# Patient Record
Sex: FEMALE | Race: WHITE | NOT HISPANIC OR LATINO | Employment: OTHER | URBAN - METROPOLITAN AREA
[De-identification: names, ages, dates, MRNs, and addresses within clinical notes are randomized per-mention and may not be internally consistent; named-entity substitution may affect disease eponyms.]

---

## 2013-05-22 LAB — CRC RECOMMENDATION EXT: NORMAL

## 2017-01-04 RX ORDER — ATORVASTATIN CALCIUM 10 MG/1
TABLET, FILM COATED ORAL
Qty: 90 TABLET | Refills: 0 | Status: SHIPPED | OUTPATIENT
Start: 2017-01-04 | End: 2017-03-12 | Stop reason: SDUPTHER

## 2017-01-09 ENCOUNTER — OFFICE VISIT (OUTPATIENT)
Dept: OBSTETRICS AND GYNECOLOGY | Facility: CLINIC | Age: 57
End: 2017-01-09
Payer: COMMERCIAL

## 2017-01-09 VITALS
SYSTOLIC BLOOD PRESSURE: 118 MMHG | BODY MASS INDEX: 21.8 KG/M2 | WEIGHT: 152.25 LBS | DIASTOLIC BLOOD PRESSURE: 70 MMHG | HEIGHT: 70 IN

## 2017-01-09 DIAGNOSIS — Z11.51 SCREENING FOR HUMAN PAPILLOMAVIRUS (HPV): ICD-10-CM

## 2017-01-09 DIAGNOSIS — Z12.31 ENCOUNTER FOR SCREENING MAMMOGRAM FOR BREAST CANCER: Primary | ICD-10-CM

## 2017-01-09 DIAGNOSIS — Z01.419 ENCOUNTER FOR GYNECOLOGICAL EXAMINATION: ICD-10-CM

## 2017-01-09 PROCEDURE — 87624 HPV HI-RISK TYP POOLED RSLT: CPT

## 2017-01-09 PROCEDURE — 99999 PR PBB SHADOW E&M-EST. PATIENT-LVL III: CPT | Mod: PBBFAC,,, | Performed by: OBSTETRICS & GYNECOLOGY

## 2017-01-09 PROCEDURE — 88175 CYTOPATH C/V AUTO FLUID REDO: CPT

## 2017-01-09 PROCEDURE — 99396 PREV VISIT EST AGE 40-64: CPT | Mod: S$GLB,,, | Performed by: OBSTETRICS & GYNECOLOGY

## 2017-01-09 RX ORDER — MEDROXYPROGESTERONE ACETATE 10 MG/1
5 TABLET ORAL DAILY
Qty: 10 TABLET | Refills: 0 | Status: SHIPPED | OUTPATIENT
Start: 2017-01-09 | End: 2018-07-02

## 2017-01-09 RX ORDER — ESTRADIOL 0.05 MG/D
PATCH, EXTENDED RELEASE TRANSDERMAL
COMMUNITY
Start: 2017-01-05 | End: 2017-01-09

## 2017-01-09 RX ORDER — BUPROPION HYDROCHLORIDE 300 MG/1
300 TABLET ORAL DAILY
Qty: 90 TABLET | Refills: 3 | Status: SHIPPED | OUTPATIENT
Start: 2017-01-09 | End: 2018-04-27 | Stop reason: SDUPTHER

## 2017-01-09 NOTE — PROGRESS NOTES
CC: Well woman exam    Jess Mena is a 56 y.o. female  presents for a well woman exam.  She is established. LMP: Patient's last menstrual period was 01/15/2014..    Patient without complaints.  Denies vaginal bleeding or abdominal pain.  Doing much better on patches.  Patient needs CombiPatch.  Was previously on Divigel and  Prometrium.  Patient does have some extra Divigel samples at home and is asking for progesterone for 1 month to take with the Divigel so it's not wasted.    Last colonoscopy  normal  Last Pap 2013 normal HPV negative  Last mammogram  normal    Health Maintenance   Topic Date Due    TETANUS VACCINE  1979    Influenza Vaccine  2016    Mammogram  2017    Pap Smear  2018    Lipid Panel  10/19/2020    Colonoscopy  08/15/2021    Hepatitis C Screening  Completed       Past Medical History   Diagnosis Date    Anxiety      wellbutrin 300    Constipation     Hyperlipidemia LDL goal < 160      muscle & joint aches with lipitor    Post-menopausal      Past Surgical History   Procedure Laterality Date    Endometrial ablation      Neck mass excision       Fatty Tumor in neck    Breast augmentation Bilateral      Social History     Social History    Marital status:      Spouse name: N/A    Number of children: 2    Years of education: N/A     Social History Main Topics    Smoking status: Never Smoker    Smokeless tobacco: Never Used    Alcohol use Yes      Comment: Occationally - once every few months    Drug use: No    Sexual activity: Yes     Partners: Male     Birth control/ protection: Post-menopausal, Surgical      Comment: :  Javi   (- )     Other Topics Concern    None     Social History Narrative    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     Family History   Problem Relation Age of Onset    Heart disease Father 60     cad    Hypertension Father     Stroke Father     Hyperlipidemia Father     Hyperlipidemia Mother     Heart disease Mother      asd    No Known Problems Sister     Hyperlipidemia Brother     No Known Problems Daughter     No Known Problems Daughter     Breast cancer Neg Hx     Cancer Neg Hx     Colon cancer Neg Hx     Ovarian cancer Neg Hx      OB History      Para Term  AB TAB SAB Ectopic Multiple Living    2 2 2       2        Obstetric Comments    Menarche age 12          Visit Vitals    /70    Ht 5' 10" (1.778 m)    Wt 69 kg (152 lb 3.6 oz)    LMP 01/15/2014    BMI 21.84 kg/m2         ROS:    ROS:  GENERAL: Denies weight gain or weight loss. Feeling well overall.   SKIN: Denies rash or lesions.   HEAD: Denies head injury or headache.   NODES: Denies enlarged lymph nodes.   CHEST: Denies chest pain or shortness of breath.   CARDIOVASCULAR: Denies palpitations or left sided chest pain.   ABDOMEN: No abdominal pain, constipation, diarrhea, nausea, vomiting or rectal bleeding.   URINARY: No frequency, dysuria, hematuria, or burning on urination.  REPRODUCTIVE: See HPI.   BREASTS: The patient performs breast self-examination and denies pain, lumps, or nipple discharge.   HEMATOLOGIC: No easy bruisability or excessive bleeding.   MUSCULOSKELETAL: Denies joint pain or swelling.   NEUROLOGIC: Denies syncope or weakness.   PSYCHIATRIC: Denies depression, anxiety or mood swings.    PHYSICAL EXAM:    APPEARANCE: Well nourished, well developed, in no acute distress.  AFFECT: WNL, alert and oriented x 3  SKIN: No acne or hirsutism  ABDOMEN: Soft.  No tenderness or masses.  No hepatosplenomegaly.  No hernias.  BREASTS: Symmetrical, no skin changes or visible lesions.  No palpable masses, nipple discharge bilaterally.  PELVIC: Normal external genitalia without lesions.  Normal hair " distribution.  Adequate perineal body, normal urethral meatus.  Vagina moist and well rugated without lesions or discharge.  Cervix pink, without lesions, discharge or tenderness.  No significant cystocele or rectocele.  Bimanual exam shows uterus to be normal size, regular, mobile and nontender.  Adnexa without masses or tenderness.    EXTREMITIES: No edema.      ICD-10-CM ICD-9-CM    1. Encounter for screening mammogram for breast cancer Z12.31 V76.12 Mammo Digital Screening Bilat with Tomosynthesis CAD      Mammo Digital Screening Bilat with Tomosynthesis CAD   2. Encounter for gynecological examination Z01.419 V72.31 Liquid-based pap smear, screening      estradiol-norethindrone (COMBIPATCH) 0.05-0.25 mg/24 hr   3. Screening for human papillomavirus (HPV) Z11.51 V73.81 HPV DNA probe, amplified         Patient was counseled today on A.C.S. Pap guidelines and recommendations for yearly pelvic exams, mammograms and monthly self breast exams; to see her PCP for other health maintenance.     Return in about 1 year (around 1/9/2018).

## 2017-01-09 NOTE — MR AVS SNAPSHOT
[address]  [place] [address]  Phone: [phone]  Fax: [phone]                  Jess Mena   2017 11:00 AM   Office Visit    Description:  Female : 1960   Provider:  Derick Villavicencio MD   Department:  [facility]           Reason for Visit     Well Woman           Diagnoses this Visit        Comments    Encounter for screening mammogram for breast cancer    -  Primary     Encounter for gynecological examination         Screening for human papillomavirus (HPV)                To Do List           Goals (5 Years of Data)     None      [facility]     [facility] Care [facility] -  Assistance  Registered nurses in the [facility] provide clinical advisement, health education, appointment booking, and other advisory services.  Call for this free service at [phone].             Medications           Message regarding Medications     Verify the changes and/or additions to your medication regime listed below are the same as discussed with your clinician today.  If any of these changes or additions are incorrect, please notify your healthcare provider.        STOP taking these medications     CREAM BASE NO. 9 (HRT BASE MISC) by Misc.(Non-Drug; Combo Route) route.    valacyclovir (VALTREX) 500 MG tablet Take 2 tablets (1,000 mg total) by mouth once daily.           Verify that the below list of medications is an accurate representation of the medications you are currently taking.  If none reported, the list may be blank. If incorrect, please contact your healthcare provider. Carry this list with you in case of emergency.           Current Medications     atorvastatin (LIPITOR) 10 MG tablet Take 1 tablet by mouth  daily    buPROPion (WELLBUTRIN XL) 300 MG 24 hr tablet Take 1 tablet (300 mg total) by mouth once daily.    progesterone (PROMETRIUM) 100 MG capsule Take 1 capsule (100 mg total) by mouth once daily.     "tretinoin, emollient, (RENOVA) 0.02 % Crea 1 Cream Topical Every day    VALACYCLOVIR HCL (VALTREX ORAL) as needed    VIVELLE-DOT 0.05 mg/24 hr            Clinical Reference Information           Vital Signs - Last Recorded  Most recent update: 1/9/2017 11:57 AM by Chelsey Bro MA    BP Ht Wt LMP BMI    118/70 5' 10" (1.778 m) 69 kg (152 lb 3.6 oz) 01/15/2014 21.84 kg/m2      Blood Pressure          Most Recent Value    BP  118/70      Allergies as of 1/9/2017     No Known Allergies      Immunizations Administered on Date of Encounter - 1/9/2017     None      Orders Placed During Today's Visit      Normal Orders This Visit    HPV DNA probe, amplified     Liquid-based pap smear, screening     Mammo Digital Screening Bilat with Tomosynthesis CAD     Future Labs/Procedures Expected by Expires    Mammo Digital Screening Bilat with Tomosynthesis CAD  1/9/2017 3/11/2018      "

## 2017-01-11 DIAGNOSIS — Z01.419 ENCOUNTER FOR GYNECOLOGICAL EXAMINATION: ICD-10-CM

## 2017-01-11 NOTE — TELEPHONE ENCOUNTER
Dr Villavicencio pt came in yesterday and Rx of Combioatch was suppose to go to her Mail order service not [facility]. Please send Rx to the [email or web address] mail order service.

## 2017-01-16 LAB — HUMAN PAPILLOMAVIRUS (HPV): NOT DETECTED

## 2017-01-18 ENCOUNTER — TELEPHONE (OUTPATIENT)
Dept: OBSTETRICS AND GYNECOLOGY | Facility: CLINIC | Age: 57
End: 2017-01-18

## 2017-01-18 NOTE — TELEPHONE ENCOUNTER
José Miguel, a pharmacist with [facility] called about pt's combipatch rx. He would like to know if this rx will take the place of the provera and vivelle dot rxs because the dosage of those two adds up to be the same dosage as combipatch.   He can be reached at [phone] option 1 and then the option to speak to a pharmacist and use reference # [id number]

## 2017-01-24 ENCOUNTER — TELEPHONE (OUTPATIENT)
Dept: OBSTETRICS AND GYNECOLOGY | Facility: CLINIC | Age: 57
End: 2017-01-24

## 2017-01-24 NOTE — TELEPHONE ENCOUNTER
Bone pt, was sent combipatch  by mail order pharmacy instead of vivelledot. Pt says someone from our office has to call mail order pharm [facility] and tell them that pt was not aware of medicine change so she can be reimbursed the $200 that she was charged for the combipatch.  [facility] phone # [phone]

## 2017-01-25 NOTE — TELEPHONE ENCOUNTER
LMOR for pt. Stating that combipatch is good to take but if we send in Brad dot she will have to take progesterone with it since she still has a uterus.

## 2017-02-20 ENCOUNTER — TELEPHONE (OUTPATIENT)
Dept: OBSTETRICS AND GYNECOLOGY | Facility: CLINIC | Age: 57
End: 2017-02-20

## 2017-02-20 NOTE — TELEPHONE ENCOUNTER
Dr Villavicencio pt calling, Just a FYI didn't get the Combipatch it was to expensive pt returned the meds to the pharmacy.

## 2017-02-23 NOTE — TELEPHONE ENCOUNTER
Spoke with patient she said she currently has a 3 month supply of Progesterone 100mg QD and Vivelle dot 0.05mg. She said she filled it right before her appointment. She will call back in 3 months for us to send new medication.

## 2017-03-12 DIAGNOSIS — Z00.00 ANNUAL PHYSICAL EXAM: Primary | ICD-10-CM

## 2017-03-13 RX ORDER — ATORVASTATIN CALCIUM 10 MG/1
TABLET, FILM COATED ORAL
Qty: 90 TABLET | Refills: 0 | Status: SHIPPED | OUTPATIENT
Start: 2017-03-13 | End: 2017-12-18 | Stop reason: SDUPTHER

## 2017-03-14 ENCOUNTER — TELEPHONE (OUTPATIENT)
Dept: INTERNAL MEDICINE | Facility: CLINIC | Age: 57
End: 2017-03-14

## 2017-03-14 DIAGNOSIS — Z00.00 ANNUAL PHYSICAL EXAM: Primary | ICD-10-CM

## 2017-03-14 NOTE — TELEPHONE ENCOUNTER
Pt has scheduled annual exam 5/2 with Dr. Arambula.  Pt requesting external lab orders to [facility].

## 2017-03-28 ENCOUNTER — PATIENT MESSAGE (OUTPATIENT)
Dept: OBSTETRICS AND GYNECOLOGY | Facility: CLINIC | Age: 57
End: 2017-03-28

## 2017-03-28 RX ORDER — VALACYCLOVIR HYDROCHLORIDE 500 MG/1
1000 TABLET, FILM COATED ORAL DAILY
Qty: 180 TABLET | Refills: 0 | Status: SHIPPED | OUTPATIENT
Start: 2017-03-28 | End: 2017-12-18 | Stop reason: SDUPTHER

## 2017-03-28 NOTE — TELEPHONE ENCOUNTER
Does she want a combination pill likefemhrt or does she want two separate pills such as estradiol and Prometrium.  She already has a prescription of Prometrium  Let me know and I will send them.

## 2017-03-28 NOTE — TELEPHONE ENCOUNTER
She wants a combination pill like Fem HRT  Orders she wants to continue her progesterone and added oral estradiol?

## 2017-03-28 NOTE — TELEPHONE ENCOUNTER
Bone pt wrote in through the portal saying that she is due for a refill of her vivelle dot and progesterone but that Dr. Villavicencio had mentioned a pill form for the estradiol instead of the patch. Also requesting a 90 day supply of valcyclovir. Allergies and pharmacy are up to date.

## 2017-03-29 NOTE — TELEPHONE ENCOUNTER
Spoke with patient. She picked up the rx for Combipatch but it was very expensive. She is okay with staying on the Vivelle dot patch and progesterone but will need rx sent to mail order pharmacy which I updated. The Vivelle-dot and progesterone are both on her Tier one.   From looking at old medication list she was on Vivelle-dot 0.05/mg/24 hr and progesterone 100 mg once daily.

## 2017-04-10 ENCOUNTER — TELEPHONE (OUTPATIENT)
Dept: INTERNAL MEDICINE | Facility: CLINIC | Age: 57
End: 2017-04-10

## 2017-04-10 DIAGNOSIS — N95.1 MENOPAUSAL SYNDROME: Primary | ICD-10-CM

## 2017-04-10 RX ORDER — ESTRADIOL 0.05 MG/D
1 FILM, EXTENDED RELEASE TRANSDERMAL WEEKLY
Qty: 12 PATCH | Refills: 3 | Status: SHIPPED | OUTPATIENT
Start: 2017-04-10 | End: 2017-04-11 | Stop reason: SDUPTHER

## 2017-04-10 RX ORDER — PROGESTERONE 100 MG/1
100 CAPSULE ORAL DAILY
Qty: 90 CAPSULE | Refills: 3 | Status: SHIPPED | OUTPATIENT
Start: 2017-04-10 | End: 2017-04-17 | Stop reason: SDUPTHER

## 2017-04-10 NOTE — TELEPHONE ENCOUNTER
Dr. Villavicencio's pt calling, pt would like a 90 day supply of vivelle-dot and progesterone 100 mg [facility] [phone]. Pt would also like a 1 month supply of progesterone to be sent to [facility] [phone] because her Rx will not be sent by the time she runs out of the Rx. Please call pt at [phone].

## 2017-04-10 NOTE — TELEPHONE ENCOUNTER
----- Message from Rajesh Conde sent at 4/10/2017  9:59 AM CDT -----  Type: Orders Request    What orders/ testing are being requested? Labs      Is there a future appointment scheduled for the patient with PCP? Yes     When?05/03/17      Comments: patient stated she need orders sent to lab court or her because of insurance . Please advise , Thanks  !

## 2017-04-11 RX ORDER — ESTRADIOL 0.05 MG/D
1 FILM, EXTENDED RELEASE TRANSDERMAL
Qty: 8 PATCH | Refills: 1 | Status: SHIPPED | OUTPATIENT
Start: 2017-04-13 | End: 2017-04-13 | Stop reason: SDUPTHER

## 2017-04-11 NOTE — TELEPHONE ENCOUNTER
[facility] requesting clarification for the Vivelle-Dot Rx. They state that the normal dosage is twice a week.

## 2017-04-11 NOTE — TELEPHONE ENCOUNTER
Pt calling requesting a one month supply of progesterone 100 mg be sent in to [facility] [phone]. She is out and mail order wont be to her in time.

## 2017-04-13 DIAGNOSIS — N95.1 MENOPAUSAL SYNDROME: ICD-10-CM

## 2017-04-13 RX ORDER — ESTRADIOL 0.05 MG/D
1 FILM, EXTENDED RELEASE TRANSDERMAL
Qty: 8 PATCH | Refills: 1 | Status: SHIPPED | OUTPATIENT
Start: 2017-04-13 | End: 2017-04-28 | Stop reason: SDUPTHER

## 2017-04-17 DIAGNOSIS — N95.1 MENOPAUSAL SYNDROME: ICD-10-CM

## 2017-04-17 RX ORDER — PROGESTERONE 100 MG/1
100 CAPSULE ORAL DAILY
Qty: 30 CAPSULE | Refills: 0 | Status: SHIPPED | OUTPATIENT
Start: 2017-04-17 | End: 2017-10-02 | Stop reason: SDUPTHER

## 2017-04-17 NOTE — TELEPHONE ENCOUNTER
Bone pt requesting refill of prometrium. Saw Dr. Saud spain her annual in January. Allergies and pharmacy are up to date. States that last prescription got sent to mail order and she was supossed to have a one month supply sent to her local pharmacy.

## 2017-04-25 LAB
ALBUMIN SERPL-MCNC: 4.6 G/DL (ref 3.5–5.5)
ALBUMIN/GLOB SERPL: 2.2 {RATIO} (ref 1.2–2.2)
ALP SERPL-CCNC: 51 IU/L (ref 39–117)
ALT SERPL-CCNC: 20 IU/L (ref 0–32)
AST SERPL-CCNC: 20 IU/L (ref 0–40)
BASOPHILS # BLD AUTO: 0 X10E3/UL (ref 0–0.2)
BASOPHILS NFR BLD AUTO: 0 %
BILIRUB SERPL-MCNC: 0.2 MG/DL (ref 0–1.2)
BUN SERPL-MCNC: 11 MG/DL (ref 6–24)
BUN/CREAT SERPL: 13 (ref 9–23)
CALCIUM SERPL-MCNC: 9.3 MG/DL (ref 8.7–10.2)
CHLORIDE SERPL-SCNC: 106 MMOL/L (ref 96–106)
CHOLEST SERPL-MCNC: 206 MG/DL (ref 100–199)
CO2 SERPL-SCNC: 21 MMOL/L (ref 18–29)
CREAT SERPL-MCNC: 0.84 MG/DL (ref 0.57–1)
EOSINOPHIL # BLD AUTO: 0.1 X10E3/UL (ref 0–0.4)
EOSINOPHIL NFR BLD AUTO: 2 %
ERYTHROCYTE [DISTWIDTH] IN BLOOD BY AUTOMATED COUNT: 13.7 % (ref 12.3–15.4)
GLOBULIN SER CALC-MCNC: 2.1 G/DL (ref 1.5–4.5)
GLUCOSE SERPL-MCNC: 81 MG/DL (ref 65–99)
HCT VFR BLD AUTO: 39.4 % (ref 34–46.6)
HDLC SERPL-MCNC: 87 MG/DL
HGB BLD-MCNC: 12.8 G/DL (ref 11.1–15.9)
IMM GRANULOCYTES # BLD: 0 X10E3/UL (ref 0–0.1)
IMM GRANULOCYTES NFR BLD: 0 %
LDLC SERPL CALC-MCNC: 105 MG/DL (ref 0–99)
LYMPHOCYTES # BLD AUTO: 1.3 X10E3/UL (ref 0.7–3.1)
LYMPHOCYTES NFR BLD AUTO: 28 %
MCH RBC QN AUTO: 31.9 PG (ref 26.6–33)
MCHC RBC AUTO-ENTMCNC: 32.5 G/DL (ref 31.5–35.7)
MCV RBC AUTO: 98 FL (ref 79–97)
MONOCYTES # BLD AUTO: 0.3 X10E3/UL (ref 0.1–0.9)
MONOCYTES NFR BLD AUTO: 6 %
NEUTROPHILS # BLD AUTO: 2.9 X10E3/UL (ref 1.4–7)
NEUTROPHILS NFR BLD AUTO: 64 %
PLATELET # BLD AUTO: 266 X10E3/UL (ref 150–379)
POTASSIUM SERPL-SCNC: 4.6 MMOL/L (ref 3.5–5.2)
PROT SERPL-MCNC: 6.7 G/DL (ref 6–8.5)
RBC # BLD AUTO: 4.01 X10E6/UL (ref 3.77–5.28)
SODIUM SERPL-SCNC: 147 MMOL/L (ref 134–144)
TRIGL SERPL-MCNC: 72 MG/DL (ref 0–149)
VLDLC SERPL CALC-MCNC: 14 MG/DL (ref 5–40)
WBC # BLD AUTO: 4.6 X10E3/UL (ref 3.4–10.8)

## 2017-04-28 DIAGNOSIS — N95.1 MENOPAUSAL SYNDROME: ICD-10-CM

## 2017-04-28 RX ORDER — ESTRADIOL 0.05 MG/D
1 FILM, EXTENDED RELEASE TRANSDERMAL
Qty: 8 PATCH | Refills: 6 | Status: SHIPPED | OUTPATIENT
Start: 2017-05-01 | End: 2017-05-19 | Stop reason: SDUPTHER

## 2017-04-28 NOTE — TELEPHONE ENCOUNTER
Bone pt requesting prescription of vivelle-dot be called in to mail order pharmacy. Saw Dr. Villavicencio for her annual in January. Allergies and pharmacy are up to date.

## 2017-05-02 ENCOUNTER — OFFICE VISIT (OUTPATIENT)
Dept: INTERNAL MEDICINE | Facility: CLINIC | Age: 57
End: 2017-05-02
Payer: COMMERCIAL

## 2017-05-02 VITALS
HEIGHT: 70 IN | OXYGEN SATURATION: 97 % | HEART RATE: 66 BPM | DIASTOLIC BLOOD PRESSURE: 70 MMHG | SYSTOLIC BLOOD PRESSURE: 94 MMHG | WEIGHT: 151 LBS | BODY MASS INDEX: 21.62 KG/M2

## 2017-05-02 DIAGNOSIS — Z78.9 FREQUENT USE OF LAXATIVES: ICD-10-CM

## 2017-05-02 DIAGNOSIS — Z00.00 ANNUAL PHYSICAL EXAM: Primary | ICD-10-CM

## 2017-05-02 DIAGNOSIS — E78.5 HYPERLIPIDEMIA, UNSPECIFIED HYPERLIPIDEMIA TYPE: ICD-10-CM

## 2017-05-02 DIAGNOSIS — F41.9 ANXIETY: ICD-10-CM

## 2017-05-02 DIAGNOSIS — K59.09 CONSTIPATION, CHRONIC: ICD-10-CM

## 2017-05-02 DIAGNOSIS — T78.3XXA PERIODIC EDEMA, INITIAL ENCOUNTER: ICD-10-CM

## 2017-05-02 PROCEDURE — 99999 PR PBB SHADOW E&M-EST. PATIENT-LVL III: CPT | Mod: PBBFAC,,, | Performed by: INTERNAL MEDICINE

## 2017-05-02 PROCEDURE — 99396 PREV VISIT EST AGE 40-64: CPT | Mod: S$GLB,,, | Performed by: INTERNAL MEDICINE

## 2017-05-02 RX ORDER — FUROSEMIDE 20 MG/1
TABLET ORAL
Qty: 30 TABLET | Refills: 0 | Status: SHIPPED | OUTPATIENT
Start: 2017-05-02 | End: 2020-07-29

## 2017-05-02 NOTE — PROGRESS NOTES
Subjective:       Patient ID: Jess Mena is a 56 y.o. female.    Chief Complaint: Annual Exam    HPI   C/o retaining fluid.  She takes her mother's lasix - half a tab every couple of weeks.    Taking wellbutrin for anxiety, which helps.     She has life long constipation.  She takes MOM  X 4 once a week.  She discussed with the physician who preformed colonoscopy.   Review of Systems   Constitutional: Negative for fever and unexpected weight change.   HENT: Positive for congestion (at times.  ). Negative for postnasal drip.    Respiratory: Negative for chest tightness, shortness of breath and wheezing.    Cardiovascular: Negative for chest pain and leg swelling.   Gastrointestinal: Negative for abdominal pain, anal bleeding, constipation, diarrhea, nausea and vomiting.   Genitourinary: Negative for dysuria and urgency.   Musculoskeletal: Negative for arthralgias and back pain.   Skin: Negative for rash.   Neurological: Negative for headaches.   Psychiatric/Behavioral: Negative for dysphoric mood and sleep disturbance (controlled with hormones). The patient is not nervous/anxious.        Objective:      Physical Exam   Constitutional: She is oriented to person, place, and time. She appears well-developed and well-nourished. No distress.   HENT:   Head: Normocephalic and atraumatic.   Right Ear: External ear normal.   Left Ear: External ear normal.   Nose: Nose normal.   Mouth/Throat: Oropharynx is clear and moist.   Eyes: Conjunctivae and EOM are normal. Pupils are equal, round, and reactive to light. Right eye exhibits no discharge. Left eye exhibits no discharge. No scleral icterus.   Neck: Normal range of motion. Neck supple. No JVD present. No thyromegaly present.   Cardiovascular: Normal rate, regular rhythm and normal heart sounds.  Exam reveals no gallop.    No murmur heard.  Pulmonary/Chest: Effort normal and breath sounds normal. No respiratory distress. She has no wheezes. She has no rales.   Abdominal:  Soft. Bowel sounds are normal. She exhibits no distension and no mass. There is no tenderness. There is no rebound and no guarding.   Musculoskeletal: Normal range of motion. She exhibits no edema or tenderness.   Lymphadenopathy:     She has no cervical adenopathy.   Neurological: She is alert and oriented to person, place, and time. No cranial nerve deficit. Coordination normal.   Skin: Skin is warm and dry. No rash noted.   Psychiatric: She has a normal mood and affect. Her behavior is normal. Judgment and thought content normal.       Results for orders placed or performed in visit on 03/14/17   CBC auto differential   Result Value Ref Range    WBC 4.6 3.4 - 10.8 x10E3/uL    RBC 4.01 3.77 - 5.28 x10E6/uL    Hemoglobin 12.8 11.1 - 15.9 g/dL    Hematocrit 39.4 34.0 - 46.6 %    MCV 98 (H) 79 - 97 fL    MCH 31.9 26.6 - 33.0 pg    MCHC 32.5 31.5 - 35.7 g/dL    RDW 13.7 12.3 - 15.4 %    Platelets 266 150 - 379 x10E3/uL    Neutrophils Relative 64 %    Lymph% 28 %    Mono% 6 %    Eosinophil% 2 %    Basophil% 0 %    Neutrophils Absolute 2.9 1.4 - 7.0 x10E3/uL    Lymph # 1.3 0.7 - 3.1 x10E3/uL    Mono # 0.3 0.1 - 0.9 x10E3/uL    Eos # 0.1 0.0 - 0.4 x10E3/uL    Baso # 0.0 0.0 - 0.2 x10E3/uL    Immature Granulocytes 0 %    Immature Grans (Abs) 0.0 0.0 - 0.1 x10E3/uL   Comprehensive metabolic panel   Result Value Ref Range    Glucose 81 65 - 99 mg/dL    BUN, Bld 11 6 - 24 mg/dL    Creatinine 0.84 0.57 - 1.00 mg/dL    eGFR if non African American 78 >59 mL/min/1.73    eGFR if African American 90 >59 mL/min/1.73    BUN/Creatinine Ratio 13 9 - 23    Sodium 147 (H) 134 - 144 mmol/L    Potassium 4.6 3.5 - 5.2 mmol/L    Chloride 106 96 - 106 mmol/L    CO2 21 18 - 29 mmol/L    Calcium 9.3 8.7 - 10.2 mg/dL    Total Protein 6.7 6.0 - 8.5 g/dL    Albumin 4.6 3.5 - 5.5 g/dL    Globulin, Total 2.1 1.5 - 4.5 g/dL    Albumin/Globulin Ratio 2.2 1.2 - 2.2    Total Bilirubin 0.2 0.0 - 1.2 mg/dL    Alkaline Phosphatase 51 39 - 117 IU/L    AST  20 0 - 40 IU/L    ALT 20 0 - 32 IU/L   Lipid panel   Result Value Ref Range    Cholesterol 206 (H) 100 - 199 mg/dL    Triglycerides 72 0 - 149 mg/dL    HDL 87 >39 mg/dL    VLDL Cholesterol Albert 14 5 - 40 mg/dL    LDL Calculated 105 (H) 0 - 99 mg/dL     Assessment:       1. Annual physical exam    2. Constipation, chronic    3. Hyperlipidemia, unspecified hyperlipidemia type    4. Anxiety    5. Periodic edema, initial encounter        Plan:       Jess was seen today for annual exam.    Diagnoses and all orders for this visit:    Annual physical exam    Constipation, chronic.   Laxative dependent      - fiber; try magnesium tablet.  Hyperlipidemia, unspecified hyperlipidemia type    Anxiety    Periodic edema, initial encounter    Other orders  -     furosemide (LASIX) 20 MG tablet; 1 tab po q day                       For prn, infrequent use only.                       Leg elevation, salt avoidance, compression hose.

## 2017-05-02 NOTE — MR AVS SNAPSHOT
[facility]  [address]  [place] [address]  Phone: [phone]  Fax: [phone]                  Jess Mena   2017 2:00 PM   Office Visit    Description:  Female : 1960   Provider:  Shanna Arambula MD   Department:  [facility] - [facility] Medicine           Reason for Visit     Annual Exam           Diagnoses this Visit        Comments    Annual physical exam    -  Primary     Constipation, chronic         Hyperlipidemia, unspecified hyperlipidemia type         Anxiety         Periodic edema, initial encounter                To Do List           Goals (5 Years of Data)     None      Follow-Up and Disposition     Return in about 1 year (around 2018) for PE.       These Medications        Disp Refills Start End    furosemide (LASIX) 20 MG tablet 30 tablet 0 2017     1 tab po q day    [address] #: [phone]         [facility]     [facility] On Call Nurse Care Line -  Assistance  Unless otherwise directed by your provider, please contact [facility], our nurse care line that is available for  assistance.     Registered nurses in the [facility] Center provide: appointment scheduling, clinical advisement, health education, and other advisory services.  Call: [phone] (toll free)               Medications           Message regarding Medications     Verify the changes and/or additions to your medication regime listed below are the same as discussed with your clinician today.  If any of these changes or additions are incorrect, please notify your healthcare provider.        START taking these NEW medications        Refills    furosemide (LASIX) 20 MG tablet 0    Si tab po q day    Class: Normal      STOP taking these medications     tretinoin, emollient, (RENOVA) 0.02 % Crea 1 Cream Topical Every day           Verify that the below list of  "medications is an accurate representation of the medications you are currently taking.  If none reported, the list may be blank. If incorrect, please contact your healthcare provider. Carry this list with you in case of emergency.           Current Medications     atorvastatin (LIPITOR) 10 MG tablet Take 1 tablet by mouth  daily    buPROPion (WELLBUTRIN XL) 300 MG 24 hr tablet Take 1 tablet (300 mg total) by mouth once daily.    estradiol 0.05 mg/24 hr td ptsw (VIVELLE-DOT) 0.05 mg/24 hr Place 1 patch onto the skin twice a week. Place 1 patch onto skin twice a week    medroxyPROGESTERone (PROVERA) 10 MG tablet Take 0.5 tablets (5 mg total) by mouth once daily.    progesterone (PROMETRIUM) 100 MG capsule Take 1 capsule (100 mg total) by mouth once daily.    furosemide (LASIX) 20 MG tablet 1 tab po q day    valacyclovir (VALTREX) 500 MG tablet Take 2 tablets (1,000 mg total) by mouth once daily.           Clinical Reference Information           Your Vitals Were     BP Pulse Height Weight Last Period SpO2    94/70 (BP Location: Left arm, Patient Position: Sitting, BP Method: Manual) 66 5' 10" (1.778 m) 68.5 kg (151 lb) 01/15/2014 97%    BMI                21.67 kg/m2          Blood Pressure          Most Recent Value    BP  94/70      Allergies as of 5/2/2017     No Known Allergies      Immunizations Administered on Date of Encounter - 5/2/2017     None      Language Assistance Services     ATTENTION: Language assistance services are available, free of charge. Please call [phone].      ATENCIÓN: Si habla mercyañol, tiene a carmichael disposición servicios gratuitos de asistencia lingüística. Llame al [phone].     CHÚ Ý: N?u b?n nói Ti?ng Vi?t, có các d?ch v? h? tr? ngôn ng? mi?n phí dành cho b?n. G?i s? [phone].         [facility] complies with applicable Federal civil rights laws and does not discriminate on the basis of race, color, national origin, age, disability, or sex.        "

## 2017-05-19 DIAGNOSIS — N95.1 MENOPAUSAL SYNDROME: ICD-10-CM

## 2017-05-19 RX ORDER — ESTRADIOL 0.05 MG/D
1 FILM, EXTENDED RELEASE TRANSDERMAL
Qty: 24 PATCH | Refills: 2 | Status: SHIPPED | OUTPATIENT
Start: 2017-05-22 | End: 2017-05-24 | Stop reason: SDUPTHER

## 2017-05-24 DIAGNOSIS — N95.1 MENOPAUSAL SYNDROME: ICD-10-CM

## 2017-05-24 RX ORDER — ESTRADIOL 0.05 MG/D
1 FILM, EXTENDED RELEASE TRANSDERMAL
Qty: 24 PATCH | Refills: 1 | Status: SHIPPED | OUTPATIENT
Start: 2017-05-25 | End: 2017-10-02 | Stop reason: SDUPTHER

## 2017-10-02 ENCOUNTER — PATIENT MESSAGE (OUTPATIENT)
Dept: OBSTETRICS AND GYNECOLOGY | Facility: CLINIC | Age: 57
End: 2017-10-02

## 2017-10-02 DIAGNOSIS — N95.1 MENOPAUSAL SYNDROME: ICD-10-CM

## 2017-10-02 RX ORDER — PROGESTERONE 100 MG/1
100 CAPSULE ORAL DAILY
Qty: 90 CAPSULE | Refills: 0 | Status: SHIPPED | OUTPATIENT
Start: 2017-10-02 | End: 2017-12-20 | Stop reason: SDUPTHER

## 2017-10-02 RX ORDER — ESTRADIOL 0.05 MG/D
1 FILM, EXTENDED RELEASE TRANSDERMAL
Qty: 24 PATCH | Refills: 0 | Status: SHIPPED | OUTPATIENT
Start: 2017-10-02 | End: 2017-12-20 | Stop reason: SDUPTHER

## 2017-12-18 RX ORDER — ATORVASTATIN CALCIUM 10 MG/1
10 TABLET, FILM COATED ORAL DAILY
Qty: 90 TABLET | Refills: 1 | OUTPATIENT
Start: 2017-12-18 | End: 2017-12-20 | Stop reason: SDUPTHER

## 2017-12-18 RX ORDER — VALACYCLOVIR HYDROCHLORIDE 500 MG/1
1000 TABLET, FILM COATED ORAL DAILY
Qty: 180 TABLET | Refills: 0 | Status: SHIPPED | OUTPATIENT
Start: 2017-12-18 | End: 2018-07-02 | Stop reason: SDUPTHER

## 2017-12-18 NOTE — TELEPHONE ENCOUNTER
Bone pt- requesting refill of Valtrex. Patient seen for annual 5/2/17.    Allergies and Pharm UTD  Valtrex pended

## 2017-12-20 DIAGNOSIS — N95.1 MENOPAUSAL SYNDROME: ICD-10-CM

## 2017-12-20 RX ORDER — ATORVASTATIN CALCIUM 10 MG/1
10 TABLET, FILM COATED ORAL DAILY
Qty: 90 TABLET | Refills: 0 | Status: SHIPPED | OUTPATIENT
Start: 2017-12-20 | End: 2018-03-01 | Stop reason: SDUPTHER

## 2017-12-20 RX ORDER — ESTRADIOL 0.05 MG/D
1 FILM, EXTENDED RELEASE TRANSDERMAL
Qty: 24 PATCH | Refills: 0 | Status: SHIPPED | OUTPATIENT
Start: 2017-12-21 | End: 2018-02-24 | Stop reason: SDUPTHER

## 2017-12-20 RX ORDER — PROGESTERONE 100 MG/1
100 CAPSULE ORAL DAILY
Qty: 90 CAPSULE | Refills: 0 | Status: SHIPPED | OUTPATIENT
Start: 2017-12-20 | End: 2018-03-01 | Stop reason: SDUPTHER

## 2017-12-20 NOTE — TELEPHONE ENCOUNTER
Patient requesting refill of Prometrium and Vivelle-dot. Seen for annual on 1/9/17.    Allergies and Pharm UTD  prometrium and Vivelle pended

## 2018-02-24 DIAGNOSIS — N95.1 MENOPAUSAL SYNDROME: ICD-10-CM

## 2018-02-26 RX ORDER — ESTRADIOL 0.05 MG/D
FILM, EXTENDED RELEASE TRANSDERMAL
Qty: 8 PATCH | Refills: 11 | Status: SHIPPED | OUTPATIENT
Start: 2018-02-26 | End: 2018-02-28 | Stop reason: SDUPTHER

## 2018-02-28 RX ORDER — ESTRADIOL 0.05 MG/D
FILM, EXTENDED RELEASE TRANSDERMAL
Qty: 24 PATCH | Refills: 3 | Status: SHIPPED | OUTPATIENT
Start: 2018-02-28 | End: 2018-07-02 | Stop reason: SDUPTHER

## 2018-02-28 NOTE — TELEPHONE ENCOUNTER
Spoke to [facility]ist at [facility], quantity prescribed is less than allowed on patient's Rx plan. They are asking for a 90 day supply so that the patient's insurance will cover.    Allergies and Pharm UTD  Estradiol patch pended with 90 day supply and 3 additional refills.

## 2018-02-28 NOTE — TELEPHONE ENCOUNTER
Pharm fax RX CLARIFICATION REQUEST    ESTRADIOL TRANSDERM PATCH 8S    The quantity originally prescribed is less than allowed on the patient's prescription plan. Increasing the days supply may allow the patient to take full advantage of their plan benefits. Please sign and return form to approve a new 90-DAY prescription.    Pharm P:[phone]

## 2018-03-01 DIAGNOSIS — N95.1 MENOPAUSAL SYNDROME: ICD-10-CM

## 2018-03-02 RX ORDER — ATORVASTATIN CALCIUM 10 MG/1
TABLET, FILM COATED ORAL
Qty: 90 TABLET | Refills: 0 | Status: SHIPPED | OUTPATIENT
Start: 2018-03-02 | End: 2018-06-03 | Stop reason: SDUPTHER

## 2018-03-02 RX ORDER — PROGESTERONE 100 MG/1
CAPSULE ORAL
Qty: 90 CAPSULE | Refills: 0 | Status: SHIPPED | OUTPATIENT
Start: 2018-03-02 | End: 2018-05-31 | Stop reason: SDUPTHER

## 2018-04-27 RX ORDER — BUPROPION HYDROCHLORIDE 300 MG/1
300 TABLET ORAL DAILY
Qty: 90 TABLET | Refills: 0 | Status: SHIPPED | OUTPATIENT
Start: 2018-04-27 | End: 2018-07-02 | Stop reason: SDUPTHER

## 2018-05-31 DIAGNOSIS — N95.1 MENOPAUSAL SYNDROME: ICD-10-CM

## 2018-05-31 RX ORDER — PROGESTERONE 100 MG/1
CAPSULE ORAL
Qty: 90 CAPSULE | Refills: 0 | Status: SHIPPED | OUTPATIENT
Start: 2018-05-31 | End: 2018-07-02 | Stop reason: SDUPTHER

## 2018-06-04 ENCOUNTER — PATIENT MESSAGE (OUTPATIENT)
Dept: FAMILY MEDICINE | Facility: CLINIC | Age: 58
End: 2018-06-04

## 2018-06-04 DIAGNOSIS — Z00.00 ANNUAL PHYSICAL EXAM: Primary | ICD-10-CM

## 2018-06-04 RX ORDER — ATORVASTATIN CALCIUM 10 MG/1
TABLET, FILM COATED ORAL
Qty: 90 TABLET | Refills: 0 | Status: SHIPPED | OUTPATIENT
Start: 2018-06-04 | End: 2018-08-28 | Stop reason: SDUPTHER

## 2018-06-07 ENCOUNTER — CLINICAL SUPPORT (OUTPATIENT)
Dept: OTHER | Facility: CLINIC | Age: 58
End: 2018-06-07
Payer: COMMERCIAL

## 2018-06-07 VITALS
SYSTOLIC BLOOD PRESSURE: 107 MMHG | BODY MASS INDEX: 21.9 KG/M2 | HEIGHT: 70 IN | DIASTOLIC BLOOD PRESSURE: 60 MMHG | WEIGHT: 153 LBS

## 2018-06-07 DIAGNOSIS — Z00.8 HEALTH EXAMINATION IN POPULATION SURVEYS: Primary | ICD-10-CM

## 2018-06-07 LAB
GLUCOSE SERPL-MCNC: 97 MG/DL (ref 60–140)
POC CHOLESTEROL, HDL: 99 MG/DL (ref 40–?)
POC CHOLESTEROL, LDL: NORMAL MG/DL (ref ?–160)
POC CHOLESTEROL, TOTAL: 207 MG/DL (ref ?–240)
POC GLUCOSE FASTING: NORMAL MG/DL (ref 60–110)
POC TOTAL CHOLESTEROL / HDL RATIO: 2.05 (ref ?–6)
POC TRIGLYCERIDES: 53 MG/DL (ref ?–160)

## 2018-06-07 PROCEDURE — 82947 ASSAY GLUCOSE BLOOD QUANT: CPT | Mod: QW,S$GLB,, | Performed by: INTERNAL MEDICINE

## 2018-06-07 PROCEDURE — 80061 LIPID PANEL: CPT | Mod: QW,S$GLB,, | Performed by: INTERNAL MEDICINE

## 2018-06-07 PROCEDURE — 99401 PREV MED CNSL INDIV APPRX 15: CPT | Mod: S$GLB,,, | Performed by: INTERNAL MEDICINE

## 2018-06-23 LAB
ALBUMIN SERPL-MCNC: 4.8 G/DL (ref 3.5–5.5)
ALBUMIN/GLOB SERPL: 2.1 {RATIO} (ref 1.2–2.2)
ALP SERPL-CCNC: 58 IU/L (ref 39–117)
ALT SERPL-CCNC: 21 IU/L (ref 0–32)
AST SERPL-CCNC: 22 IU/L (ref 0–40)
BASOPHILS # BLD AUTO: 0 X10E3/UL (ref 0–0.2)
BASOPHILS NFR BLD AUTO: 0 %
BILIRUB SERPL-MCNC: 0.4 MG/DL (ref 0–1.2)
BUN SERPL-MCNC: 21 MG/DL (ref 6–24)
BUN/CREAT SERPL: 25 (ref 9–23)
CALCIUM SERPL-MCNC: 9.3 MG/DL (ref 8.7–10.2)
CHLORIDE SERPL-SCNC: 103 MMOL/L (ref 96–106)
CHOLEST SERPL-MCNC: 205 MG/DL (ref 100–199)
CO2 SERPL-SCNC: 22 MMOL/L (ref 20–29)
CREAT SERPL-MCNC: 0.84 MG/DL (ref 0.57–1)
EOSINOPHIL # BLD AUTO: 0.1 X10E3/UL (ref 0–0.4)
EOSINOPHIL NFR BLD AUTO: 2 %
ERYTHROCYTE [DISTWIDTH] IN BLOOD BY AUTOMATED COUNT: 13.7 % (ref 12.3–15.4)
GFR SERPLBLD CREATININE-BSD FMLA CKD-EPI: 78 ML/MIN/1.73
GFR SERPLBLD CREATININE-BSD FMLA CKD-EPI: 90 ML/MIN/1.73
GLOBULIN SER CALC-MCNC: 2.3 G/DL (ref 1.5–4.5)
GLUCOSE SERPL-MCNC: 86 MG/DL (ref 65–99)
HCT VFR BLD AUTO: 40.9 % (ref 34–46.6)
HDLC SERPL-MCNC: 82 MG/DL
HGB BLD-MCNC: 13.1 G/DL (ref 11.1–15.9)
IMM GRANULOCYTES # BLD: 0 X10E3/UL (ref 0–0.1)
IMM GRANULOCYTES NFR BLD: 0 %
LDLC SERPL CALC-MCNC: 109 MG/DL (ref 0–99)
LYMPHOCYTES # BLD AUTO: 1.5 X10E3/UL (ref 0.7–3.1)
LYMPHOCYTES NFR BLD AUTO: 20 %
MCH RBC QN AUTO: 32.6 PG (ref 26.6–33)
MCHC RBC AUTO-ENTMCNC: 32 G/DL (ref 31.5–35.7)
MCV RBC AUTO: 102 FL (ref 79–97)
MONOCYTES # BLD AUTO: 0.7 X10E3/UL (ref 0.1–0.9)
MONOCYTES NFR BLD AUTO: 9 %
NEUTROPHILS # BLD AUTO: 5.3 X10E3/UL (ref 1.4–7)
NEUTROPHILS NFR BLD AUTO: 69 %
PLATELET # BLD AUTO: 278 X10E3/UL (ref 150–379)
POTASSIUM SERPL-SCNC: 4.4 MMOL/L (ref 3.5–5.2)
PROT SERPL-MCNC: 7.1 G/DL (ref 6–8.5)
RBC # BLD AUTO: 4.02 X10E6/UL (ref 3.77–5.28)
SODIUM SERPL-SCNC: 140 MMOL/L (ref 134–144)
TRIGL SERPL-MCNC: 68 MG/DL (ref 0–149)
VLDLC SERPL CALC-MCNC: 14 MG/DL (ref 5–40)
WBC # BLD AUTO: 7.7 X10E3/UL (ref 3.4–10.8)

## 2018-07-02 ENCOUNTER — OFFICE VISIT (OUTPATIENT)
Dept: OBSTETRICS AND GYNECOLOGY | Facility: CLINIC | Age: 58
End: 2018-07-02
Payer: COMMERCIAL

## 2018-07-02 VITALS
HEIGHT: 70 IN | BODY MASS INDEX: 22.09 KG/M2 | WEIGHT: 154.31 LBS | DIASTOLIC BLOOD PRESSURE: 70 MMHG | SYSTOLIC BLOOD PRESSURE: 122 MMHG

## 2018-07-02 DIAGNOSIS — F41.9 ANXIETY: Primary | ICD-10-CM

## 2018-07-02 DIAGNOSIS — N95.1 MENOPAUSAL SYNDROME: ICD-10-CM

## 2018-07-02 DIAGNOSIS — Z01.419 ENCOUNTER FOR GYNECOLOGICAL EXAMINATION: ICD-10-CM

## 2018-07-02 PROCEDURE — 99999 PR PBB SHADOW E&M-EST. PATIENT-LVL III: CPT | Mod: PBBFAC,,, | Performed by: OBSTETRICS & GYNECOLOGY

## 2018-07-02 PROCEDURE — 99396 PREV VISIT EST AGE 40-64: CPT | Mod: S$GLB,,, | Performed by: OBSTETRICS & GYNECOLOGY

## 2018-07-02 RX ORDER — BUPROPION HYDROCHLORIDE 300 MG/1
300 TABLET ORAL DAILY
Qty: 90 TABLET | Refills: 3 | Status: SHIPPED | OUTPATIENT
Start: 2018-07-02 | End: 2019-06-27 | Stop reason: SDUPTHER

## 2018-07-02 RX ORDER — ESTRADIOL 0.05 MG/D
FILM, EXTENDED RELEASE TRANSDERMAL
Qty: 24 PATCH | Refills: 3 | Status: SHIPPED | OUTPATIENT
Start: 2018-07-02 | End: 2019-06-26 | Stop reason: SDUPTHER

## 2018-07-02 RX ORDER — VALACYCLOVIR HYDROCHLORIDE 500 MG/1
1000 TABLET, FILM COATED ORAL DAILY
Qty: 180 TABLET | Refills: 0 | Status: SHIPPED | OUTPATIENT
Start: 2018-07-02 | End: 2018-12-19 | Stop reason: SDUPTHER

## 2018-07-02 RX ORDER — PROGESTERONE 100 MG/1
100 CAPSULE ORAL DAILY
Qty: 90 CAPSULE | Refills: 3 | Status: SHIPPED | OUTPATIENT
Start: 2018-07-02 | End: 2019-08-04 | Stop reason: SDUPTHER

## 2018-07-02 NOTE — PROGRESS NOTES
Chief Complaint: well woman exam  Well Woman (Annual Exam, last pap  pap & hpv negative, Last mammo  at [facility] per pt.)      Jess Mena is a 57 y.o. female  presents for a well woman exam.    She is established.  Doing well On HRT and Wellbutrin  Menopausal No PMB No abd pain     LMP: Patient's last menstrual period was 01/15/2014..      Last pap: 2017 normal and hpv neg  Last MM2018 [facility] normal per pt  Last colonoscopy:  normal      Current Outpatient Prescriptions:     atorvastatin (LIPITOR) 10 MG tablet, TAKE 1 TABLET DAILY, Disp: 90 tablet, Rfl: 0    buPROPion (WELLBUTRIN XL) 300 MG 24 hr tablet, Take 1 tablet (300 mg total) by mouth once daily., Disp: 90 tablet, Rfl: 3    estradiol 0.05 mg/24 hr td ptsw (VIVELLE-DOT) 0.05 mg/24 hr, APPLY 1 PATCH ONTO THE SKIN TWICE WEEKLY, Disp: 24 patch, Rfl: 3    furosemide (LASIX) 20 MG tablet, 1 tab po q day, Disp: 30 tablet, Rfl: 0    progesterone (PROMETRIUM) 100 MG capsule, Take 1 capsule (100 mg total) by mouth once daily., Disp: 90 capsule, Rfl: 3    valACYclovir (VALTREX) 500 MG tablet, Take 2 tablets (1,000 mg total) by mouth once daily., Disp: 180 tablet, Rfl: 0    Past Medical History:   Diagnosis Date    Anxiety     wellbutrin 300    Constipation     Hyperlipidemia LDL goal < 160     muscle & joint aches with lipitor    Post-menopausal        Past Surgical History:   Procedure Laterality Date    BREAST AUGMENTATION Bilateral     ENDOMETRIAL ABLATION      NECK MASS EXCISION      Fatty Tumor in neck       OB History    Para Term  AB Living   2 2 2     2   SAB TAB Ectopic Multiple Live Births           2      # Outcome Date GA Lbr Lawrence/2nd Weight Sex Delivery Anes PTL Lv   2 Term  40w0d  3.374 kg (7 lb 7 oz) F Vag-Spont EPI  ALFONSO   1 Term  40w0d  3.629 kg (8 lb) F Vag-Spont EPI  ALFONSO      Obstetric Comments   Menarche age 12       Family History   Problem Relation Age of Onset    Heart  "disease Father 60        cad    Hypertension Father     Stroke Father     Hyperlipidemia Father     Hyperlipidemia Mother     Heart disease Mother         asd    Hypertension Mother     No Known Problems Sister     Hyperlipidemia Brother     Hypertension Brother     No Known Problems Daughter     No Known Problems Daughter     Breast cancer Neg Hx     Cancer Neg Hx     Colon cancer Neg Hx     Ovarian cancer Neg Hx        Social History   Substance Use Topics    Smoking status: Never Smoker    Smokeless tobacco: Never Used    Alcohol use Yes      Comment: Occationally - once every few months         ROS:    GENERAL: Denies weight gain or weight loss. Feeling well overall.   SKIN: Denies rash or lesions.   HEENT: Denies headaches, or vision changes.   CARDIOVASCULAR: Denies palpitations or left sided chest pain.   RESPIRATORY: Denies shortness of breath or dyspnea on exertion.  BREASTS: Denies pain, lumps, or nipple discharge.   ABDOMEN: Denies abdominal pain, constipation, diarrhea, nausea, vomiting, change in appetite or rectal bleeding.   URINARY: Denies frequency, dysuria, hematuria.  NEUROLOGIC: Denies syncope or weakness.   PSYCHIATRIC: Denies depression, anxiety or mood swings.    Physical Exam:  /70   Ht 5' 10" (1.778 m)   Wt 70 kg (154 lb 5.2 oz)   LMP 01/15/2014   BMI 22.14 kg/m²     APPEARANCE: Well nourished, well developed, in no acute distress.  AFFECT: WNL, alert and oriented x 3  SKIN: No acne or hirsutism  BREASTS: Symmetrical, no skin changes.                    No nipple discharge. No palpable masses bilaterally  ABDOMEN: soft Non tender Non distended No masses  PELVIC:   Normal external genitalia without lesions.  Normal hair distribution.   Adequate perineal body, normal urethral meatus. No signif cystocele or rectocele.  Vagina moist and well rugated without lesions or discharge.    Cervix pink, without lesions, discharge or tenderness.    Bimanual exam shows uterus to " be normal size, regular, mobile and nontender.  Adnexa without masses or tenderness.    EXTREMITIES: No edema.    ASSESSMENT AND PLAN  1. Anxiety  buPROPion (WELLBUTRIN XL) 300 MG 24 hr tablet   2. Menopausal syndrome  estradiol 0.05 mg/24 hr td ptsw (VIVELLE-DOT) 0.05 mg/24 hr    progesterone (PROMETRIUM) 100 MG capsule   3. Encounter for gynecological examination         Jess was seen today for well woman.    Diagnoses and all orders for this visit:    Anxiety  -     buPROPion (WELLBUTRIN XL) 300 MG 24 hr tablet; Take 1 tablet (300 mg total) by mouth once daily.    Menopausal syndrome  -     estradiol 0.05 mg/24 hr td ptsw (VIVELLE-DOT) 0.05 mg/24 hr; APPLY 1 PATCH ONTO THE SKIN TWICE WEEKLY  -     progesterone (PROMETRIUM) 100 MG capsule; Take 1 capsule (100 mg total) by mouth once daily.    Encounter for gynecological examination   Pap not due til 2020    Other orders  -     valACYclovir (VALTREX) 500 MG tablet; Take 2 tablets (1,000 mg total) by mouth once daily.        Patient was counseled today on A.C.S. Pap guidelines and recommendations for yearly pelvic exams, mammograms and monthly self breast exams; to see her PCP for other health maintenance.     Patient encouraged to register for portal and results will be sent via portal.    Follow-up in about 1 year (around 7/2/2019).         Health Maintenance   Topic Date Due    TETANUS VACCINE  08/03/1979    Mammogram  09/29/2017    Influenza Vaccine  08/01/2018    Pap Smear with HPV Cotest  01/09/2020    Colonoscopy  08/15/2021    Lipid Panel  06/22/2023    Hepatitis C Screening  Completed

## 2018-08-29 RX ORDER — ATORVASTATIN CALCIUM 10 MG/1
TABLET, FILM COATED ORAL
Qty: 90 TABLET | Refills: 0 | Status: SHIPPED | OUTPATIENT
Start: 2018-08-29 | End: 2018-11-26 | Stop reason: SDUPTHER

## 2018-10-18 ENCOUNTER — OFFICE VISIT (OUTPATIENT)
Dept: FAMILY MEDICINE | Facility: CLINIC | Age: 58
End: 2018-10-18
Payer: COMMERCIAL

## 2018-10-18 VITALS
SYSTOLIC BLOOD PRESSURE: 98 MMHG | BODY MASS INDEX: 22.38 KG/M2 | WEIGHT: 156.31 LBS | HEIGHT: 70 IN | TEMPERATURE: 98 F | HEART RATE: 63 BPM | DIASTOLIC BLOOD PRESSURE: 62 MMHG

## 2018-10-18 DIAGNOSIS — Z00.00 ANNUAL PHYSICAL EXAM: Primary | ICD-10-CM

## 2018-10-18 DIAGNOSIS — A60.00 GENITAL HERPES SIMPLEX, UNSPECIFIED SITE: ICD-10-CM

## 2018-10-18 DIAGNOSIS — F41.9 ANXIETY: ICD-10-CM

## 2018-10-18 DIAGNOSIS — Z23 NEED FOR PROPHYLACTIC VACCINATION AND INOCULATION AGAINST INFLUENZA: ICD-10-CM

## 2018-10-18 DIAGNOSIS — T78.3XXA PERIODIC EDEMA, INITIAL ENCOUNTER: ICD-10-CM

## 2018-10-18 DIAGNOSIS — Z28.39 IMMUNIZATION DEFICIENCY: ICD-10-CM

## 2018-10-18 PROCEDURE — 99999 PR PBB SHADOW E&M-EST. PATIENT-LVL III: CPT | Mod: PBBFAC,,, | Performed by: FAMILY MEDICINE

## 2018-10-18 PROCEDURE — 90471 IMMUNIZATION ADMIN: CPT | Mod: S$GLB,,, | Performed by: FAMILY MEDICINE

## 2018-10-18 PROCEDURE — 90686 IIV4 VACC NO PRSV 0.5 ML IM: CPT | Mod: S$GLB,,, | Performed by: FAMILY MEDICINE

## 2018-10-18 PROCEDURE — 90715 TDAP VACCINE 7 YRS/> IM: CPT | Mod: S$GLB,,, | Performed by: FAMILY MEDICINE

## 2018-10-18 PROCEDURE — 99396 PREV VISIT EST AGE 40-64: CPT | Mod: 25,S$GLB,, | Performed by: FAMILY MEDICINE

## 2018-10-18 PROCEDURE — 90472 IMMUNIZATION ADMIN EACH ADD: CPT | Mod: S$GLB,,, | Performed by: FAMILY MEDICINE

## 2018-10-18 RX ORDER — HYDROCHLOROTHIAZIDE 25 MG/1
25 TABLET ORAL DAILY
Qty: 30 TABLET | Refills: 0 | Status: SHIPPED | OUTPATIENT
Start: 2018-10-18 | End: 2021-06-14 | Stop reason: SDUPTHER

## 2018-10-18 NOTE — PROGRESS NOTES
Subjective:      Patient ID: Jess Mena is a 58 y.o. female.    Chief Complaint: Annual Exam and Flu Vaccine    Here today for general exam.     She follows with gynecologist for female exam is and mammogram at [facility]    She has always had difficulty with constipation, about once a week.  She states she drinks lots of water    Previous physician prescribed Lasix 20 mg p.r.n. intermittent leg edema.  She prescribed this medication because she was using her mom's.  This #30  has lasted 1 year.    Denies any chest pain, shortness of breath, nausea vomiting diarrhea, blood in stool, heartburn    The 10-year ASCVD risk score (Keveldon BOOTH Jr., et al., 2013) is: 1.1%    Values used to calculate the score:      Age: 58 years      Sex: Female      Is Non- : No      Diabetic: No      Tobacco smoker: No      Systolic Blood Pressure: 98 mmHg      Is BP treated: No      HDL Cholesterol: 82 mg/dL      Total Cholesterol: 205 mg/dL      No results found for: HGBA1C  No results found for: MICALBCREAT  Lab Results   Component Value Date    LDLCALC 109 (H) 06/22/2018    LDLCALC 105 (H) 04/24/2017    CHOL 205 (H) 06/22/2018    HDL 82 06/22/2018    TRIG 68 06/22/2018       Lab Results   Component Value Date     06/22/2018    K 4.4 06/22/2018     06/22/2018    CO2 22 06/22/2018    GLU 86 06/22/2018    BUN 21 06/22/2018    CREATININE 0.84 06/22/2018    CALCIUM 9.3 06/22/2018    PROT 7.1 06/22/2018    ALBUMIN 4.8 06/22/2018    BILITOT 0.4 06/22/2018    ALKPHOS 58 06/22/2018    AST 22 06/22/2018    ALT 21 06/22/2018    EGFRNONAA 78 06/22/2018    WBC 7.7 06/22/2018    HGB 13.1 06/22/2018    HCT 40.9 06/22/2018     (H) 06/22/2018     06/22/2018    TSH 0.669 05/28/2009         Current Outpatient Medications on File Prior to Visit   Medication Sig    atorvastatin (LIPITOR) 10 MG tablet TAKE 1 TABLET DAILY    buPROPion (WELLBUTRIN XL) 300 MG 24 hr tablet Take 1 tablet (300 mg total) by mouth once  "daily.    estradiol 0.05 mg/24 hr td ptsw (VIVELLE-DOT) 0.05 mg/24 hr APPLY 1 PATCH ONTO THE SKIN TWICE WEEKLY    furosemide (LASIX) 20 MG tablet 1 tab po q day    progesterone (PROMETRIUM) 100 MG capsule Take 1 capsule (100 mg total) by mouth once daily.    valACYclovir (VALTREX) 500 MG tablet Take 2 tablets (1,000 mg total) by mouth once daily.     No current facility-administered medications on file prior to visit.      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        Walks several times a week, weight training.     Family History   Problem Relation Age of Onset    Heart disease Father 60        cad    Hypertension Father     Stroke Father     Hyperlipidemia Father     Hyperlipidemia Mother     Heart disease Mother         asd    Hypertension Mother     No Known Problems Sister     Hyperlipidemia Brother     Hypertension Brother     No Known Problems Daughter     No Known Problems Daughter     Breast cancer Neg Hx     Cancer Neg Hx     Colon cancer Neg Hx     Ovarian cancer Neg Hx      Vitals:    10/18/18 1018   BP: 98/62   Pulse: 63   Temp: 98.3 °F (36.8 °C)   TempSrc: Oral   Weight: 70.9 kg (156 lb 4.9 oz)   Height: 5' 9.5" (1.765 m)   PainSc: 0-No pain     Objective:   Physical Exam   Constitutional: She is oriented to person, place, and time. She appears " well-developed and well-nourished.   HENT:   Head: Normocephalic and atraumatic.   Right Ear: External ear normal.   Left Ear: External ear normal.   Nose: Nose normal.   Mouth/Throat: Oropharynx is clear and moist.   Eyes: EOM are normal. Pupils are equal, round, and reactive to light.   Neck: Neck supple. No thyromegaly present.   Cardiovascular: Normal rate, regular rhythm, normal heart sounds and intact distal pulses.   No murmur heard.  Pulmonary/Chest: Effort normal and breath sounds normal. She has no wheezes.   Abdominal: Soft. Bowel sounds are normal. She exhibits no distension and no mass. There is no tenderness. There is no rebound and no guarding.   Musculoskeletal: She exhibits no edema.   Lymphadenopathy:     She has no cervical adenopathy.   Neurological: She is alert and oriented to person, place, and time.   Skin: Skin is warm and dry.   Psychiatric: She has a normal mood and affect. Her behavior is normal.     Assessment:     1. Annual physical exam    2. Anxiety    3. Genital herpes simplex, unspecified site    4. Immunization deficiency    5. Need for prophylactic vaccination and inoculation against influenza    6. Periodic edema, initial encounter      Plan:     Orders Placed This Encounter    (In Office Administered) Tdap Vaccine    Flu Vaccine - Quadrivalent (PF) (3 years & older)    hydroCHLOROthiazide (HYDRODIURIL) 25 MG tablet     Stop Lasix    She desires to continue Wellbutrin 300, but okay to wean to 150 and I can send refills whenever she likes    Patient Instructions   Due for  Vaccines  - tetanus     Follow with GYN for female health & cancer prevention    ==========  Increase WATER INTAKE - your body systems work best & smoothly with 8 glasses of water a day.     Increase FIBER INTAKE - your body is happiest with FIVE  fruits and  vegetables daily. Challenge yourself to eat FIVE fruit/ vegetable COLORS in a day. You will be amazed as your body regulates itself.     You could try  taking ACIDOPHILUS or a PROBIOTIC daily (over the counter) - to give back the good normal bugs (normal gut Marisol) to your GI tract -- that we tend to kill off with our American diets    With respect to FIBER intake, also try eating Cheerios or Oatmeal daily. This absorbs water & help your colon to work smoothly.    If you have lots of GAS - Switch to NON-FERMENTABLE FIBER - calcium polycarbophil , 1-2tablets,  2 times a day    Drink enough water that your urine is clear (which hydrates your colon &  kidneys)    A FULL GLASS OF WATER UPON AWAKENING  will wake up your colon & start your GI tract moving.     Drink a glass of water before any caffeinated drink (coffee, iced tea, carbonated beverage, energy drink)    Decrease caffeine  & chocolate (caffeine is dehydrating)    GET MOVING-- Walking & being active (20 minutes most days of the week) tells your colon to start moving also. On the other hand, sitting in a chair most of the day tells your GI tract to slow to a halt. Give it a boost with exercise.    If you do get constipated & do not have a bowel movement after 3 days -- try 8 glasses of water a day, Colace stool softener 2 tablets twice daily, AND a laxative twice daily. If no bowel movement after 24 hours, start an enema twice daily. Come in to see me if no bowel movement.       ==============================  RECOMMENDATIONS FOR FEMALES  ==============================  Your #1 MEDICINE is DAILY EXERCISE - 15-20 minutes of huffing & puffing EVERY DAY.     Prevent the #1 cause of death- cardiovascular disease (HEART ATTACK & STROKE) by checking for normal blood pressure, cholesterol, sugars, & by not smoking.     VACCINES: Yearly FLU shot, PNEUMONIA shot after 65,  SHINGLES shot after 50    Screening colonoscopy at AGE  50 & every 10 years to check for COLON CANCER,  one of the most common & preventable cancers (Or FIT kit yearly) Repeat in 3 years if POLYP found     I recommend  high fiber (5 fresh fruits or  vegetables daily), low fat diet and aerobic  exercise (huffing/ puffing/ sweating for 20 min straight at least 4 days a week)    Follow up yearly with mammogram, fasting lipids, CMP, CBC prior.   ==============================================================                                  Answers for HPI/ROS submitted by the patient on 10/18/2018   activity change: No  unexpected weight change: No  neck pain: No  hearing loss: No  rhinorrhea: No  trouble swallowing: No  eye discharge: No  visual disturbance: No  chest tightness: No  wheezing: No  chest pain: No  palpitations: No  blood in stool: No  constipation: No  vomiting: No  diarrhea: No  polydipsia: No  polyuria: No  difficulty urinating: No  hematuria: No  menstrual problem: No  dysuria: No  joint swelling: No  arthralgias: No  headaches: No  weakness: No  confusion: No  dysphoric mood: No

## 2018-10-18 NOTE — PROGRESS NOTES
Two patient identifiers used, allergies reviewed, vaccines confirmed.  Tdap vaccine administered IM left deltoid.  Influenza vaccine administered right deltoid.  Pt tolerated both injections well, no redness or bruising at either injection site.  Pt advised to remain in clinic 15 mins following injections for observation.

## 2018-10-18 NOTE — PATIENT INSTRUCTIONS
Due for  Vaccines  - tetanus     Follow with GYN for female health & cancer prevention    ==========  Increase WATER INTAKE - your body systems work best & smoothly with 8 glasses of water a day.     Increase FIBER INTAKE - your body is happiest with FIVE  fruits and  vegetables daily. Challenge yourself to eat FIVE fruit/ vegetable COLORS in a day. You will be amazed as your body regulates itself.     You could try taking ACIDOPHILUS or a PROBIOTIC daily (over the counter) - to give back the good normal bugs (normal gut Marisol) to your GI tract -- that we tend to kill off with our American diets    With respect to FIBER intake, also try eating Cheerios or Oatmeal daily. This absorbs water & help your colon to work smoothly.    If you have lots of GAS - Switch to NON-FERMENTABLE FIBER - calcium polycarbophil , 1-2tablets,  2 times a day    Drink enough water that your urine is clear (which hydrates your colon &  kidneys)    A FULL GLASS OF WATER UPON AWAKENING  will wake up your colon & start your GI tract moving.     Drink a glass of water before any caffeinated drink (coffee, iced tea, carbonated beverage, energy drink)    Decrease caffeine  & chocolate (caffeine is dehydrating)    GET MOVING-- Walking & being active (20 minutes most days of the week) tells your colon to start moving also. On the other hand, sitting in a chair most of the day tells your GI tract to slow to a halt. Give it a boost with exercise.    If you do get constipated & do not have a bowel movement after 3 days -- try 8 glasses of water a day, Colace stool softener 2 tablets twice daily, AND a laxative twice daily. If no bowel movement after 24 hours, start an enema twice daily. Come in to see me if no bowel movement.       ==============================  RECOMMENDATIONS FOR FEMALES  ==============================  Your #1 MEDICINE is DAILY EXERCISE - 15-20 minutes of huffing & puffing EVERY DAY.     Prevent the #1 cause of death-  cardiovascular disease (HEART ATTACK & STROKE) by checking for normal blood pressure, cholesterol, sugars, & by not smoking.     VACCINES: Yearly FLU shot, PNEUMONIA shot after 65,  SHINGLES shot after 50    Screening colonoscopy at AGE  50 & every 10 years to check for COLON CANCER,  one of the most common & preventable cancers (Or FIT kit yearly) Repeat in 3 years if POLYP found     I recommend  high fiber (5 fresh fruits or vegetables daily), low fat diet and aerobic  exercise (huffing/ puffing/ sweating for 20 min straight at least 4 days a week)    Follow up yearly with mammogram, fasting lipids, CMP, CBC prior.   ==============================================================

## 2018-11-27 RX ORDER — ATORVASTATIN CALCIUM 10 MG/1
TABLET, FILM COATED ORAL
Qty: 90 TABLET | Refills: 3 | Status: SHIPPED | OUTPATIENT
Start: 2018-11-27 | End: 2019-11-27 | Stop reason: SDUPTHER

## 2018-12-19 RX ORDER — VALACYCLOVIR HYDROCHLORIDE 500 MG/1
500 TABLET, FILM COATED ORAL DAILY
Qty: 90 TABLET | Refills: 0 | Status: SHIPPED | OUTPATIENT
Start: 2018-12-19 | End: 2019-09-04 | Stop reason: SDUPTHER

## 2019-01-23 ENCOUNTER — OFFICE VISIT (OUTPATIENT)
Dept: PODIATRY | Facility: CLINIC | Age: 59
End: 2019-01-23
Payer: COMMERCIAL

## 2019-01-23 VITALS
SYSTOLIC BLOOD PRESSURE: 105 MMHG | BODY MASS INDEX: 22.33 KG/M2 | HEIGHT: 70 IN | DIASTOLIC BLOOD PRESSURE: 59 MMHG | HEART RATE: 66 BPM | WEIGHT: 156 LBS

## 2019-01-23 DIAGNOSIS — M67.479 GANGLION CYST OF FOOT: Primary | ICD-10-CM

## 2019-01-23 PROCEDURE — 99203 PR OFFICE/OUTPT VISIT, NEW, LEVL III, 30-44 MIN: ICD-10-PCS | Mod: 25,S$GLB,, | Performed by: PODIATRIST

## 2019-01-23 PROCEDURE — 20600: ICD-10-PCS | Mod: RT,S$GLB,, | Performed by: PODIATRIST

## 2019-01-23 PROCEDURE — 99999 PR PBB SHADOW E&M-EST. PATIENT-LVL IV: CPT | Mod: PBBFAC,,, | Performed by: PODIATRIST

## 2019-01-23 PROCEDURE — 99999 PR PBB SHADOW E&M-EST. PATIENT-LVL IV: ICD-10-PCS | Mod: PBBFAC,,, | Performed by: PODIATRIST

## 2019-01-23 PROCEDURE — 99203 OFFICE O/P NEW LOW 30 MIN: CPT | Mod: 25,S$GLB,, | Performed by: PODIATRIST

## 2019-01-23 PROCEDURE — 3008F BODY MASS INDEX DOCD: CPT | Mod: CPTII,S$GLB,, | Performed by: PODIATRIST

## 2019-01-23 PROCEDURE — 3008F PR BODY MASS INDEX (BMI) DOCUMENTED: ICD-10-PCS | Mod: CPTII,S$GLB,, | Performed by: PODIATRIST

## 2019-01-23 PROCEDURE — 20600 DRAIN/INJ JOINT/BURSA W/O US: CPT | Mod: RT,S$GLB,, | Performed by: PODIATRIST

## 2019-01-23 RX ORDER — DEXAMETHASONE SODIUM PHOSPHATE 4 MG/ML
4 INJECTION, SOLUTION INTRA-ARTICULAR; INTRALESIONAL; INTRAMUSCULAR; INTRAVENOUS; SOFT TISSUE EVERY 6 HOURS
Status: DISCONTINUED | OUTPATIENT
Start: 2019-01-23 | End: 2020-07-31

## 2019-01-23 RX ORDER — DEXAMETHASONE SODIUM PHOSPHATE 4 MG/ML
4 INJECTION, SOLUTION INTRA-ARTICULAR; INTRALESIONAL; INTRAMUSCULAR; INTRAVENOUS; SOFT TISSUE
Status: DISCONTINUED | OUTPATIENT
Start: 2019-01-23 | End: 2019-01-23 | Stop reason: HOSPADM

## 2019-01-23 RX ADMIN — DEXAMETHASONE SODIUM PHOSPHATE 4 MG: 4 INJECTION, SOLUTION INTRA-ARTICULAR; INTRALESIONAL; INTRAMUSCULAR; INTRAVENOUS; SOFT TISSUE at 01:01

## 2019-01-23 NOTE — PROCEDURES
Small Joint Aspiration/Injection: R third PIP  Date/Time: 1/23/2019 1:39 PM  Performed by: Pat Thao DPM  Authorized by: Pat Thao DPM     Consent Done?:  Yes (Verbal)  Site marked: The procedure site was marked    Timeout: Prior to procedure the correct patient, procedure, and site was verified      Location:  Third toe  Site:  R third PIP  Prep: Patient was prepped and draped in usual sterile fashion    Ultrasonic Guidance for needle placement: No  Needle size:  25 G  Approach:  Dorsal  Medications:  4 mg dexamethasone 4 mg/mL  Aspirate amount (ml):  0.5  Aspirate:  Clear and serous  Patient tolerance:  Patient tolerated the procedure well with no immediate complications     Additional Comments: Timeout was performed with pt in room

## 2019-01-23 NOTE — PROGRESS NOTES
Subjective:      Patient ID: Jess Mena is a 58 y.o. female.    Chief Complaint: Ganglion Cyst (Right 3rd metatarsal)    58 years old female presenting with cyst on top of right 3rd toe.  She 1st noticed few months ago. She has been soaking and also of flank topical antibiotics.  Denies any pain, but discomfort when she wears closed shoes.     Review of Systems   Constitution: Negative for decreased appetite, fever, weakness and malaise/fatigue.   HENT: Negative for congestion.    Cardiovascular: Negative for chest pain and leg swelling.   Respiratory: Negative for cough and shortness of breath.    Skin: Positive for suspicious lesions. Negative for color change, nail changes and rash.   Musculoskeletal: Negative for arthritis, joint pain, joint swelling and muscle weakness.   Gastrointestinal: Negative for bloating, abdominal pain, nausea and vomiting.   Neurological: Negative for headaches and numbness.   Psychiatric/Behavioral: Negative for altered mental status.           Objective:      Physical Exam   Constitutional: She is oriented to person, place, and time. She appears well-developed and well-nourished. No distress.   Musculoskeletal: She exhibits tenderness. She exhibits no edema or deformity.   Neurological: She is oriented to person, place, and time.   Skin: Skin is warm. Capillary refill takes less than 2 seconds. No erythema.   Psychiatric: She has a normal mood and affect. Her behavior is normal.     Vascular: Distal DP/PT pulses palpable 2/4 b/l. CRT < 3 sec to tips of toes. No edema noted to b/l LE. No vericosities noted to b/l LEs. Hair growth present b/l LE, warm to touch b/l LE    Dermatologic: No open lesions, lacerations or wounds.  Interdigital spaces clean, dry and intact b/l. No erythema, rubor, calor noted to b/l LE  Right 3rd toe: + soft tissue mass/fluid-filled cyst, nonmobile    Musculoskeletal: MMT 5/5 in DF/PF/Inv/Ev resistance with no reproduction of pain in any direction. Passive  range of motion of ankle and pedal joints is painless b/l. No calf tenderness b/l LE, Compartments soft/compressible b/l.   Right 3rd toe:  Mild tenderness upon palpation.     Neurological: Light touch, proprioception, and sharp/dull sensation are all intact b/l. Protective threshold with the [place]-Wienstein monofilament is intact b/l. Vibratory sensation intact b/l.     1/23/19                Assessment:       Encounter Diagnosis   Name Primary?    Ganglion cyst of foot - Right Foot Yes         Plan:       Jess was seen today for ganglion cyst.    Diagnoses and all orders for this visit:    Ganglion cyst of foot - Right Foot      I counseled the patient on her conditions, their implications and medical management.    58 years old female with right foot ganglion cyst versus epidermal cyst on 3rd toe    -status post aspiration and injection of steroid.  See procedure note.  Patient tolerated well  -topical triple abx applied with bandaid  -I told her about possibility of recurrence, if so I recommended soft tissue mass resection surgically vs fusion of the joint.   -The nature of the condition, options for management, as well as potential risks and complications were discussed in detail with patient. Patient was amenable to my recommendations and left my office fully informed and will follow up as instructed or sooner if necessary.    -Patient was advised of signs and symptoms of infection including redness, drainage, purulence, odor, streaking, fever, chills and I advised patient to seek medical attention (ER or urgent care) if these symptoms arise.

## 2019-05-21 ENCOUNTER — CLINICAL SUPPORT (OUTPATIENT)
Dept: OTHER | Facility: CLINIC | Age: 59
End: 2019-05-21
Payer: COMMERCIAL

## 2019-05-21 DIAGNOSIS — Z00.8 ENCOUNTER FOR OTHER GENERAL EXAMINATION: ICD-10-CM

## 2019-05-21 PROCEDURE — 99401 PREV MED CNSL INDIV APPRX 15: CPT | Mod: S$GLB,,, | Performed by: INTERNAL MEDICINE

## 2019-05-21 PROCEDURE — 80061 LIPID PANEL: CPT | Mod: QW,S$GLB,, | Performed by: INTERNAL MEDICINE

## 2019-05-21 PROCEDURE — 82947 ASSAY GLUCOSE BLOOD QUANT: CPT | Mod: QW,S$GLB,, | Performed by: INTERNAL MEDICINE

## 2019-05-21 PROCEDURE — 99401 PR PREVENT COUNSEL,INDIV,15 MIN: ICD-10-PCS | Mod: S$GLB,,, | Performed by: INTERNAL MEDICINE

## 2019-05-21 PROCEDURE — 82947 PR  ASSAY QUANTITATIVE,BLOOD GLUCOSE: ICD-10-PCS | Mod: QW,S$GLB,, | Performed by: INTERNAL MEDICINE

## 2019-05-21 PROCEDURE — 80061 PR  LIPID PANEL: ICD-10-PCS | Mod: QW,S$GLB,, | Performed by: INTERNAL MEDICINE

## 2019-05-22 VITALS — BODY MASS INDEX: 22.71 KG/M2 | HEIGHT: 70 IN

## 2019-05-22 LAB
GLUCOSE SERPL-MCNC: 92 MG/DL (ref 60–140)
HDLC SERPL-MCNC: 95 MG/DL
POC CHOLESTEROL, LDL (DOCK): 118 MG/DL
POC CHOLESTEROL, TOTAL: 230 MG/DL
TRIGL SERPL-MCNC: 88 MG/DL

## 2019-06-25 DIAGNOSIS — N95.1 MENOPAUSAL SYNDROME: ICD-10-CM

## 2019-06-26 RX ORDER — ESTRADIOL 0.05 MG/D
FILM, EXTENDED RELEASE TRANSDERMAL
Qty: 24 PATCH | Refills: 3 | Status: SHIPPED | OUTPATIENT
Start: 2019-06-26 | End: 2020-01-30 | Stop reason: SDUPTHER

## 2019-06-27 DIAGNOSIS — F41.9 ANXIETY: ICD-10-CM

## 2019-06-28 RX ORDER — BUPROPION HYDROCHLORIDE 300 MG/1
TABLET ORAL
Qty: 90 TABLET | Refills: 0 | Status: SHIPPED | OUTPATIENT
Start: 2019-06-28 | End: 2019-09-25 | Stop reason: SDUPTHER

## 2019-08-04 DIAGNOSIS — N95.1 MENOPAUSAL SYNDROME: ICD-10-CM

## 2019-08-04 RX ORDER — PROGESTERONE 100 MG/1
CAPSULE ORAL
Qty: 90 CAPSULE | Refills: 3 | Status: SHIPPED | OUTPATIENT
Start: 2019-08-04 | End: 2020-01-30 | Stop reason: SDUPTHER

## 2019-09-04 RX ORDER — VALACYCLOVIR HYDROCHLORIDE 500 MG/1
500 TABLET, FILM COATED ORAL DAILY
Qty: 90 TABLET | Refills: 0 | Status: SHIPPED | OUTPATIENT
Start: 2019-09-04 | End: 2020-10-11 | Stop reason: SDUPTHER

## 2019-09-25 DIAGNOSIS — F41.9 ANXIETY: ICD-10-CM

## 2019-09-26 RX ORDER — BUPROPION HYDROCHLORIDE 300 MG/1
TABLET ORAL
Qty: 90 TABLET | Refills: 4 | Status: SHIPPED | OUTPATIENT
Start: 2019-09-26 | End: 2020-01-30 | Stop reason: SDUPTHER

## 2019-10-10 ENCOUNTER — OFFICE VISIT (OUTPATIENT)
Dept: DERMATOLOGY | Facility: CLINIC | Age: 59
End: 2019-10-10
Payer: COMMERCIAL

## 2019-10-10 DIAGNOSIS — M67.449 DIGITAL MUCOUS CYST: ICD-10-CM

## 2019-10-10 DIAGNOSIS — B07.8 COMMON WART: ICD-10-CM

## 2019-10-10 DIAGNOSIS — L82.1 SK (SEBORRHEIC KERATOSIS): Primary | ICD-10-CM

## 2019-10-10 PROCEDURE — 99202 PR OFFICE/OUTPT VISIT, NEW, LEVL II, 15-29 MIN: ICD-10-PCS | Mod: 25,S$GLB,, | Performed by: DERMATOLOGY

## 2019-10-10 PROCEDURE — 99202 OFFICE O/P NEW SF 15 MIN: CPT | Mod: 25,S$GLB,, | Performed by: DERMATOLOGY

## 2019-10-10 PROCEDURE — 17110 DESTRUCTION B9 LES UP TO 14: CPT | Mod: S$GLB,,, | Performed by: DERMATOLOGY

## 2019-10-10 PROCEDURE — 99999 PR PBB SHADOW E&M-EST. PATIENT-LVL II: ICD-10-PCS | Mod: PBBFAC,,, | Performed by: DERMATOLOGY

## 2019-10-10 PROCEDURE — 99999 PR PBB SHADOW E&M-EST. PATIENT-LVL II: CPT | Mod: PBBFAC,,, | Performed by: DERMATOLOGY

## 2019-10-10 PROCEDURE — 17110 PR DESTRUCTION BENIGN LESIONS UP TO 14: ICD-10-PCS | Mod: S$GLB,,, | Performed by: DERMATOLOGY

## 2019-10-10 NOTE — PROGRESS NOTES
Subjective:       Patient ID:  Jess Mena is a 59 y.o. female who presents for No chief complaint on file.    Pt presents today for recurrent wart, back of calf, 1 x years, irritable, Tx. otc wart removal  Pt c/o cyst on right 3rd toe, 1 x years, fluid filled, tender at times, Tx. Prev pricked with needle drained  Pt also concerned for spot on left side face, 9 months, asymptomatic, Tx. none      Review of Systems   Constitutional: Negative for fever, chills, weight loss, weight gain, fatigue, night sweats and malaise.   Skin: Positive for daily sunscreen use, activity-related sunscreen use and wears hat.   Hematologic/Lymphatic: Does not bruise/bleed easily.        Objective:    Physical Exam   Constitutional: She appears well-developed and well-nourished.   Neurological: She is alert and oriented to person, place, and time.   Psychiatric: She has a normal mood and affect.   Skin:   Areas Examined (abnormalities noted in diagram):   Head / Face Inspection Performed  LLE Inspection Performed  Nails and Digits Inspection Performed                       Diagram Legend     Erythematous scaling macule/papule c/w actinic keratosis       Vascular papule c/w angioma      Pigmented verrucoid papule/plaque c/w seborrheic keratosis      Yellow umbilicated papule c/w sebaceous hyperplasia      Irregularly shaped tan macule c/w lentigo     1-2 mm smooth white papules consistent with Milia      Movable subcutaneous cyst with punctum c/w epidermal inclusion cyst      Subcutaneous movable cyst c/w pilar cyst      Firm pink to brown papule c/w dermatofibroma      Pedunculated fleshy papule(s) c/w skin tag(s)      Evenly pigmented macule c/w junctional nevus     Mildly variegated pigmented, slightly irregular-bordered macule c/w mildly atypical nevus      Flesh colored to evenly pigmented papule c/w intradermal nevus       Pink pearly papule/plaque c/w basal cell carcinoma      Erythematous hyperkeratotic cursted plaque c/w  SCC      Surgical scar with no sign of skin cancer recurrence      Open and closed comedones      Inflammatory papules and pustules      Verrucoid papule consistent consistent with wart     Erythematous eczematous patches and plaques     Dystrophic onycholytic nail with subungual debris c/w onychomycosis     Umbilicated papule    Erythematous-base heme-crusted tan verrucoid plaque consistent with inflamed seborrheic keratosis     Erythematous Silvery Scaling Plaque c/w Psoriasis     See annotation      Assessment / Plan:        SK (seborrheic keratosis)  These are benign inherited growths without a malignant potential. Reassurance given to patient. No treatment is necessary.     Common wart  Cryosurgery procedure note:    Verbal consent from the patient is obtained including, but not limited to, risk of hypopigmentation/hyperpigmentation, scar, recurrence of lesion. Liquid nitrogen cryosurgery is applied to 1 lesions to produce a freeze injury. The patient is aware that blisters may form and is instructed on wound care with gentle cleansing and use of vaseline ointment to keep moist until healed. The patient is supplied a handout on cryosurgery and is instructed to call if lesions do not completely resolve.    Digital mucous cyst  Prepped with alcohol and knicked with 11 blade and gelatinous hemorragic fluid removed  Refer to ortho for definitive excision            Follow up if symptoms worsen or fail to improve.

## 2019-11-27 RX ORDER — ATORVASTATIN CALCIUM 10 MG/1
TABLET, FILM COATED ORAL
Qty: 90 TABLET | Refills: 0 | Status: SHIPPED | OUTPATIENT
Start: 2019-11-27 | End: 2020-04-07 | Stop reason: SDUPTHER

## 2020-01-30 ENCOUNTER — APPOINTMENT (OUTPATIENT)
Dept: RADIOLOGY | Facility: OTHER | Age: 60
End: 2020-01-30
Attending: OBSTETRICS & GYNECOLOGY
Payer: COMMERCIAL

## 2020-01-30 ENCOUNTER — OFFICE VISIT (OUTPATIENT)
Dept: OBSTETRICS AND GYNECOLOGY | Facility: CLINIC | Age: 60
End: 2020-01-30
Payer: COMMERCIAL

## 2020-01-30 VITALS
BODY MASS INDEX: 22.27 KG/M2 | DIASTOLIC BLOOD PRESSURE: 68 MMHG | WEIGHT: 155.56 LBS | SYSTOLIC BLOOD PRESSURE: 102 MMHG | HEIGHT: 70 IN

## 2020-01-30 DIAGNOSIS — Z01.419 ENCOUNTER FOR GYNECOLOGICAL EXAMINATION: Primary | ICD-10-CM

## 2020-01-30 DIAGNOSIS — F41.9 ANXIETY: ICD-10-CM

## 2020-01-30 DIAGNOSIS — N95.1 MENOPAUSAL SYNDROME: ICD-10-CM

## 2020-01-30 DIAGNOSIS — Z12.31 VISIT FOR SCREENING MAMMOGRAM: ICD-10-CM

## 2020-01-30 PROCEDURE — 99999 PR PBB SHADOW E&M-EST. PATIENT-LVL III: ICD-10-PCS | Mod: PBBFAC,,, | Performed by: OBSTETRICS & GYNECOLOGY

## 2020-01-30 PROCEDURE — 77063 MAMMO DIGITAL SCREENING BILAT WITH TOMOSYNTHESIS_CAD: ICD-10-PCS | Mod: 26,,, | Performed by: RADIOLOGY

## 2020-01-30 PROCEDURE — 99396 PREV VISIT EST AGE 40-64: CPT | Mod: S$GLB,,, | Performed by: OBSTETRICS & GYNECOLOGY

## 2020-01-30 PROCEDURE — 77067 SCR MAMMO BI INCL CAD: CPT | Mod: 26,,, | Performed by: RADIOLOGY

## 2020-01-30 PROCEDURE — 77067 MAMMO DIGITAL SCREENING BILAT WITH TOMOSYNTHESIS_CAD: ICD-10-PCS | Mod: 26,,, | Performed by: RADIOLOGY

## 2020-01-30 PROCEDURE — 99396 PR PREVENTIVE VISIT,EST,40-64: ICD-10-PCS | Mod: S$GLB,,, | Performed by: OBSTETRICS & GYNECOLOGY

## 2020-01-30 PROCEDURE — 77067 SCR MAMMO BI INCL CAD: CPT | Mod: TC,PN

## 2020-01-30 PROCEDURE — 77063 BREAST TOMOSYNTHESIS BI: CPT | Mod: 26,,, | Performed by: RADIOLOGY

## 2020-01-30 PROCEDURE — 99999 PR PBB SHADOW E&M-EST. PATIENT-LVL III: CPT | Mod: PBBFAC,,, | Performed by: OBSTETRICS & GYNECOLOGY

## 2020-01-30 RX ORDER — BUPROPION HYDROCHLORIDE 300 MG/1
300 TABLET ORAL DAILY
Qty: 90 TABLET | Refills: 4 | Status: SHIPPED | OUTPATIENT
Start: 2020-01-30 | End: 2021-02-07

## 2020-01-30 RX ORDER — ESTRADIOL 0.05 MG/D
FILM, EXTENDED RELEASE TRANSDERMAL
Qty: 24 PATCH | Refills: 3 | Status: SHIPPED | OUTPATIENT
Start: 2020-01-30 | End: 2021-01-12

## 2020-01-30 RX ORDER — PROGESTERONE 100 MG/1
100 CAPSULE ORAL DAILY
Qty: 90 CAPSULE | Refills: 3 | Status: SHIPPED | OUTPATIENT
Start: 2020-01-30 | End: 2021-01-24

## 2020-01-30 NOTE — PROGRESS NOTES
Chief Complaint Well woman exam:  Well Woman      Jess Mena is a 59 y.o. female  presents for a well woman exam.    She is established    Review of Systems - Negative    No abdominal pain, No vaginal bleeding or discharge,   No breast pain or masses, No rectal bleeding     LMP: Patient's last menstrual period was 01/15/2014.  Estrogen and Progesterone  Vivelle dot   Last pap: 2017 normal and hpv neg  Last MMG: today pending  Last colonoscopy:  8 years ago Normal repeat at 11 yrs    Medication List with Changes/Refills   Current Medications    ATORVASTATIN (LIPITOR) 10 MG TABLET    TAKE 1 TABLET DAILY    BUPROPION (WELLBUTRIN XL) 300 MG 24 HR TABLET    TAKE 1 TABLET DAILY    FUROSEMIDE (LASIX) 20 MG TABLET    1 tab po q day    HYDROCHLOROTHIAZIDE (HYDRODIURIL) 25 MG TABLET    Take 1 tablet (25 mg total) by mouth once daily. Prn swelling    VALACYCLOVIR (VALTREX) 500 MG TABLET    Take 1 tablet (500 mg total) by mouth once daily.   Changed and/or Refilled Medications    Modified Medication Previous Medication    ESTRADIOL 0.05 MG/24 HR TD PTSW (VIVELLE-DOT) 0.05 MG/24 HR estradiol 0.05 mg/24 hr td ptsw (VIVELLE-DOT) 0.05 mg/24 hr       APPLY 1 PATCH TO SKIN TWICE WEEKLY    APPLY 1 PATCH TO SKIN TWICE WEEKLY    PROGESTERONE (PROMETRIUM) 100 MG CAPSULE progesterone (PROMETRIUM) 100 MG capsule       Take 1 capsule (100 mg total) by mouth once daily.    TAKE 1 CAPSULE DAILY       Past Medical History:   Diagnosis Date    Anxiety     wellbutrin 300    Constipation     Hyperlipidemia LDL goal < 160     muscle & joint aches with lipitor    Mixed hyperlipidemia 2013    Post-menopausal        Past Surgical History:   Procedure Laterality Date    BREAST AUGMENTATION Bilateral     ENDOMETRIAL ABLATION      NECK MASS EXCISION      Fatty Tumor in neck       OB History    Para Term  AB Living   2 2 2     2   SAB TAB Ectopic Multiple Live Births           2      # Outcome Date  "GA Lbr Lawrence/2nd Weight Sex Delivery Anes PTL Lv   2 Term 1995 40w0d  3.374 kg (7 lb 7 oz) F Vag-Spont EPI  ALFONSO   1 Term 1983 40w0d  3.629 kg (8 lb) F Vag-Spont EPI  ALFONSO      Obstetric Comments   Menarche age 12       Family History   Problem Relation Age of Onset    Heart disease Father 60        cad    Hypertension Father     Stroke Father     Hyperlipidemia Father     Hyperlipidemia Mother     Heart disease Mother         asd    Hypertension Mother     No Known Problems Sister     Hyperlipidemia Brother     Hypertension Brother     No Known Problems Daughter     No Known Problems Daughter     Breast cancer Neg Hx     Cancer Neg Hx     Colon cancer Neg Hx     Ovarian cancer Neg Hx     Melanoma Neg Hx        Social History     Tobacco Use    Smoking status: Never Smoker    Smokeless tobacco: Never Used   Substance Use Topics    Alcohol use: Yes     Comment: Occationally - once every few months    Drug use: No         ROS:  GENERAL: Denies weight gain or weight loss. Feeling well overall.   SKIN: Denies rash or lesions.   HEENT: Denies headaches, or vision changes.   CARDIOVASCULAR: Denies palpitations or left sided chest pain.   RESPIRATORY: Denies shortness of breath or dyspnea on exertion.  BREASTS: Denies pain, lumps, or nipple discharge.   ABDOMEN: Denies abdominal pain, constipation, diarrhea, nausea, vomiting, change in appetite or rectal bleeding.   URINARY: Denies frequency, dysuria, hematuria.  NEUROLOGIC: Denies syncope or weakness.   PSYCHIATRIC: Denies depression, anxiety or mood swings.    Physical Exam:  /68   Ht 5' 9.5" (1.765 m)   Wt 70.5 kg (155 lb 8.6 oz)   LMP 01/15/2014   BMI 22.64 kg/m²     APPEARANCE: Well nourished, well developed, in no acute distress.  AFFECT: WNL, alert and oriented x 3  SKIN: No acne or hirsutism  BREASTS: Symmetrical, no skin changes.                    No nipple discharge. No palpable masses bilaterally  NODES: No inguinal nor axillary " LAD  ABDOMEN: soft Non tender Non distended No masses  PELVIC:   Normal external genitalia without lesions.   Normal urethral meatus. No signif cystocele or rectocele.  Vagina atrophic without lesions or discharge.    Cervix atrophic, without lesions, discharge or tenderness.    Bimanual exam shows uterus to be normal size, regular, mobile and nontender.  Adnexa without masses or tenderness.    EXTREMITIES: No edema.    ASSESSMENT AND PLAN    Jess was seen today for well woman.    Diagnoses and all orders for this visit:    Encounter for gynecological examination    Menopausal syndrome  -     estradiol 0.05 mg/24 hr td ptsw (VIVELLE-DOT) 0.05 mg/24 hr; APPLY 1 PATCH TO SKIN TWICE WEEKLY  -     progesterone (PROMETRIUM) 100 MG capsule; Take 1 capsule (100 mg total) by mouth once daily.          Patient was counseled today on A.C.S. Pap guidelines and recommendations for yearly pelvic exams, mammograms and monthly self breast exams; to see her PCP for other health maintenance.     Patient encouraged to register for portal and results will be sent via portal.    Follow up in about 1 year (around 1/30/2021).         Health Maintenance   Topic Date Due    Pap Smear with HPV Cotest  01/09/2020    Mammogram  07/02/2020    Colonoscopy  08/15/2021    Lipid Panel  05/21/2024    TETANUS VACCINE  10/18/2028    Hepatitis C Screening  Completed

## 2020-01-31 NOTE — PROGRESS NOTES
Just wanted to let you know that I got your mammogram results back and the radiologist reading is perfectly normal. Let me know if you have any questions or concerns  Hope you have a great day!  Dr Villavicencio

## 2020-02-11 ENCOUNTER — OFFICE VISIT (OUTPATIENT)
Dept: URGENT CARE | Facility: CLINIC | Age: 60
End: 2020-02-11
Payer: COMMERCIAL

## 2020-02-11 VITALS
WEIGHT: 155 LBS | RESPIRATION RATE: 16 BRPM | HEART RATE: 81 BPM | TEMPERATURE: 99 F | HEIGHT: 69 IN | DIASTOLIC BLOOD PRESSURE: 51 MMHG | SYSTOLIC BLOOD PRESSURE: 123 MMHG | BODY MASS INDEX: 22.96 KG/M2 | OXYGEN SATURATION: 97 %

## 2020-02-11 DIAGNOSIS — R05.9 COUGH: Primary | ICD-10-CM

## 2020-02-11 DIAGNOSIS — J11.1 FLU SYNDROME: ICD-10-CM

## 2020-02-11 LAB
CTP QC/QA: YES
FLUAV AG NPH QL: NEGATIVE
FLUBV AG NPH QL: NEGATIVE

## 2020-02-11 PROCEDURE — 87804 INFLUENZA ASSAY W/OPTIC: CPT | Mod: QW,S$GLB,, | Performed by: FAMILY MEDICINE

## 2020-02-11 PROCEDURE — 99213 OFFICE O/P EST LOW 20 MIN: CPT | Mod: 25,S$GLB,, | Performed by: FAMILY MEDICINE

## 2020-02-11 PROCEDURE — 99213 PR OFFICE/OUTPT VISIT, EST, LEVL III, 20-29 MIN: ICD-10-PCS | Mod: 25,S$GLB,, | Performed by: FAMILY MEDICINE

## 2020-02-11 PROCEDURE — 87804 POCT INFLUENZA A/B: ICD-10-PCS | Mod: QW,S$GLB,, | Performed by: FAMILY MEDICINE

## 2020-02-11 RX ORDER — PROMETHAZINE HYDROCHLORIDE AND DEXTROMETHORPHAN HYDROBROMIDE 6.25; 15 MG/5ML; MG/5ML
SYRUP ORAL
Qty: 180 ML | Refills: 0 | Status: SHIPPED | OUTPATIENT
Start: 2020-02-11 | End: 2020-07-29

## 2020-02-11 RX ORDER — OSELTAMIVIR PHOSPHATE 75 MG/1
75 CAPSULE ORAL 2 TIMES DAILY
Qty: 10 CAPSULE | Refills: 0 | Status: SHIPPED | OUTPATIENT
Start: 2020-02-11 | End: 2020-02-16

## 2020-02-11 NOTE — PROGRESS NOTES
"Subjective:       Patient ID: Jess Mena is a 59 y.o. female.    Vitals:  height is 5' 9" (1.753 m) and weight is 70.3 kg (155 lb). Her oral temperature is 99.4 °F (37.4 °C). Her blood pressure is 123/51 (abnormal) and her pulse is 81. Her respiration is 16 and oxygen saturation is 97%.     Chief Complaint: Cough    59-year-old female with complaint of sore throat cough and congestion since yesterday was exposed to grandson with flu patient did had flu vaccination but feels very achy run down and subjective fever    Cough   This is a new problem. The current episode started yesterday. The problem has been gradually worsening. The problem occurs every few minutes. The cough is non-productive. Associated symptoms include chills, ear congestion, a fever, myalgias, nasal congestion, postnasal drip and a sore throat. Pertinent negatives include no ear pain, eye redness, hemoptysis, rash, shortness of breath or wheezing. Nothing aggravates the symptoms. Treatments tried: Dayquil. The treatment provided moderate relief.       Constitution: Positive for chills, fatigue and fever. Negative for sweating.   HENT: Positive for congestion, postnasal drip, sinus pain, sinus pressure and sore throat. Negative for ear pain and voice change.    Neck: Negative for painful lymph nodes.   Eyes: Negative for eye redness.   Respiratory: Positive for chest tightness and cough. Negative for sputum production, bloody sputum, COPD, shortness of breath, stridor, wheezing and asthma.    Gastrointestinal: Negative for nausea and vomiting.   Musculoskeletal: Positive for muscle ache.   Skin: Negative for rash.   Allergic/Immunologic: Negative for seasonal allergies and asthma.   Hematologic/Lymphatic: Negative for swollen lymph nodes.       Objective:      Physical Exam   Constitutional: She is oriented to person, place, and time. She appears well-developed and well-nourished. She is cooperative.  Non-toxic appearance. She does not appear " ill.   HENT:   Head: Normocephalic and atraumatic.   Right Ear: Hearing, tympanic membrane, external ear and ear canal normal.   Left Ear: Hearing, tympanic membrane, external ear and ear canal normal.   Nose: Nose normal. No mucosal edema, rhinorrhea or nasal deformity. No epistaxis. Right sinus exhibits no maxillary sinus tenderness and no frontal sinus tenderness. Left sinus exhibits no maxillary sinus tenderness and no frontal sinus tenderness.   Mouth/Throat: Uvula is midline, oropharynx is clear and moist and mucous membranes are normal. No trismus in the jaw. Normal dentition. No uvula swelling. No oropharyngeal exudate or posterior oropharyngeal erythema.   Eyes: Conjunctivae and lids are normal. Right eye exhibits no discharge. Left eye exhibits no discharge. No scleral icterus.   Neck: Trachea normal, normal range of motion, full passive range of motion without pain and phonation normal. Neck supple. No JVD present. No thyromegaly present.   Cardiovascular: Normal rate, regular rhythm, normal heart sounds, intact distal pulses and normal pulses. Exam reveals no friction rub.   No murmur heard.  Pulmonary/Chest: Effort normal and breath sounds normal. No stridor. No respiratory distress. She has no wheezes. She has no rales.   Abdominal: Soft. Normal appearance and bowel sounds are normal. She exhibits no distension, no pulsatile midline mass and no mass. There is no tenderness.   Musculoskeletal: Normal range of motion. She exhibits no edema or deformity.   Lymphadenopathy:     She has no cervical adenopathy.   Neurological: She is alert and oriented to person, place, and time. She exhibits normal muscle tone. Coordination normal.   Skin: Skin is warm, dry, intact, not diaphoretic and not pale.   Psychiatric: She has a normal mood and affect. Her speech is normal and behavior is normal. Judgment and thought content normal. Cognition and memory are normal.   Nursing note and vitals reviewed.         Assessment:       1. Cough    2. Flu syndrome        Plan:         Cough  -     POCT Influenza A/B    Flu syndrome    Other orders  -     oseltamivir (TAMIFLU) 75 MG capsule; Take 1 capsule (75 mg total) by mouth 2 (two) times daily. for 5 days  Dispense: 10 capsule; Refill: 0  -     promethazine-dextromethorphan (PROMETHAZINE-DM) 6.25-15 mg/5 mL Syrp; Take one tsp po q 6 hrs prn cough  Dispense: 180 mL; Refill: 0        patient advised rest and fluids Tylenol or alternate with ibuprofen 2 p.o. q.6 p.r.n. fever achiness

## 2020-02-27 RX ORDER — ATORVASTATIN CALCIUM 10 MG/1
TABLET, FILM COATED ORAL
Qty: 90 TABLET | Refills: 4 | OUTPATIENT
Start: 2020-02-27

## 2020-04-07 ENCOUNTER — PATIENT MESSAGE (OUTPATIENT)
Dept: PRIMARY CARE CLINIC | Facility: CLINIC | Age: 60
End: 2020-04-07

## 2020-04-07 DIAGNOSIS — Z00.00 ANNUAL PHYSICAL EXAM: Primary | ICD-10-CM

## 2020-04-07 RX ORDER — ATORVASTATIN CALCIUM 10 MG/1
10 TABLET, FILM COATED ORAL DAILY
Qty: 90 TABLET | Refills: 0 | Status: SHIPPED | OUTPATIENT
Start: 2020-04-07 | End: 2020-07-06

## 2020-07-24 ENCOUNTER — LAB VISIT (OUTPATIENT)
Dept: LAB | Facility: HOSPITAL | Age: 60
End: 2020-07-24
Attending: FAMILY MEDICINE
Payer: COMMERCIAL

## 2020-07-24 DIAGNOSIS — Z00.00 ANNUAL PHYSICAL EXAM: ICD-10-CM

## 2020-07-24 LAB
ALBUMIN SERPL BCP-MCNC: 4.2 G/DL (ref 3.5–5.2)
ALP SERPL-CCNC: 49 U/L (ref 55–135)
ALT SERPL W/O P-5'-P-CCNC: 20 U/L (ref 10–44)
ANION GAP SERPL CALC-SCNC: 7 MMOL/L (ref 8–16)
AST SERPL-CCNC: 21 U/L (ref 10–40)
BASOPHILS # BLD AUTO: 0.04 K/UL (ref 0–0.2)
BASOPHILS NFR BLD: 0.8 % (ref 0–1.9)
BILIRUB SERPL-MCNC: 0.4 MG/DL (ref 0.1–1)
BUN SERPL-MCNC: 16 MG/DL (ref 6–20)
CALCIUM SERPL-MCNC: 9.7 MG/DL (ref 8.7–10.5)
CHLORIDE SERPL-SCNC: 108 MMOL/L (ref 95–110)
CHOLEST SERPL-MCNC: 232 MG/DL (ref 120–199)
CHOLEST/HDLC SERPL: 2.6 {RATIO} (ref 2–5)
CO2 SERPL-SCNC: 26 MMOL/L (ref 23–29)
CREAT SERPL-MCNC: 0.9 MG/DL (ref 0.5–1.4)
DIFFERENTIAL METHOD: ABNORMAL
EOSINOPHIL # BLD AUTO: 0.1 K/UL (ref 0–0.5)
EOSINOPHIL NFR BLD: 2.8 % (ref 0–8)
ERYTHROCYTE [DISTWIDTH] IN BLOOD BY AUTOMATED COUNT: 13 % (ref 11.5–14.5)
EST. GFR  (AFRICAN AMERICAN): >60 ML/MIN/1.73 M^2
EST. GFR  (NON AFRICAN AMERICAN): >60 ML/MIN/1.73 M^2
GLUCOSE SERPL-MCNC: 82 MG/DL (ref 70–110)
HCT VFR BLD AUTO: 39.4 % (ref 37–48.5)
HDLC SERPL-MCNC: 88 MG/DL (ref 40–75)
HDLC SERPL: 37.9 % (ref 20–50)
HGB BLD-MCNC: 12.7 G/DL (ref 12–16)
IMM GRANULOCYTES # BLD AUTO: 0.02 K/UL (ref 0–0.04)
IMM GRANULOCYTES NFR BLD AUTO: 0.4 % (ref 0–0.5)
LDLC SERPL CALC-MCNC: 129.2 MG/DL (ref 63–159)
LYMPHOCYTES # BLD AUTO: 1.7 K/UL (ref 1–4.8)
LYMPHOCYTES NFR BLD: 35 % (ref 18–48)
MCH RBC QN AUTO: 32.2 PG (ref 27–31)
MCHC RBC AUTO-ENTMCNC: 32.2 G/DL (ref 32–36)
MCV RBC AUTO: 100 FL (ref 82–98)
MONOCYTES # BLD AUTO: 0.4 K/UL (ref 0.3–1)
MONOCYTES NFR BLD: 8.9 % (ref 4–15)
NEUTROPHILS # BLD AUTO: 2.5 K/UL (ref 1.8–7.7)
NEUTROPHILS NFR BLD: 52.1 % (ref 38–73)
NONHDLC SERPL-MCNC: 144 MG/DL
NRBC BLD-RTO: 0 /100 WBC
PLATELET # BLD AUTO: 271 K/UL (ref 150–350)
PMV BLD AUTO: 10.9 FL (ref 9.2–12.9)
POTASSIUM SERPL-SCNC: 4.4 MMOL/L (ref 3.5–5.1)
PROT SERPL-MCNC: 7.3 G/DL (ref 6–8.4)
RBC # BLD AUTO: 3.94 M/UL (ref 4–5.4)
SODIUM SERPL-SCNC: 141 MMOL/L (ref 136–145)
TRIGL SERPL-MCNC: 74 MG/DL (ref 30–150)
WBC # BLD AUTO: 4.71 K/UL (ref 3.9–12.7)

## 2020-07-24 PROCEDURE — 80053 COMPREHEN METABOLIC PANEL: CPT

## 2020-07-24 PROCEDURE — 80061 LIPID PANEL: CPT

## 2020-07-24 PROCEDURE — 86703 HIV-1/HIV-2 1 RESULT ANTBDY: CPT

## 2020-07-24 PROCEDURE — 85025 COMPLETE CBC W/AUTO DIFF WBC: CPT

## 2020-07-24 PROCEDURE — 36415 COLL VENOUS BLD VENIPUNCTURE: CPT | Mod: PO

## 2020-07-27 LAB — HIV 1+2 AB+HIV1 P24 AG SERPL QL IA: NEGATIVE

## 2020-07-29 ENCOUNTER — OFFICE VISIT (OUTPATIENT)
Dept: PRIMARY CARE CLINIC | Facility: CLINIC | Age: 60
End: 2020-07-29
Payer: COMMERCIAL

## 2020-07-29 VITALS
HEIGHT: 70 IN | OXYGEN SATURATION: 98 % | BODY MASS INDEX: 22.73 KG/M2 | SYSTOLIC BLOOD PRESSURE: 130 MMHG | DIASTOLIC BLOOD PRESSURE: 80 MMHG | HEART RATE: 62 BPM | WEIGHT: 158.75 LBS | TEMPERATURE: 98 F

## 2020-07-29 DIAGNOSIS — T78.3XXA PERIODIC EDEMA, INITIAL ENCOUNTER: ICD-10-CM

## 2020-07-29 DIAGNOSIS — A60.00 GENITAL HERPES SIMPLEX, UNSPECIFIED SITE: ICD-10-CM

## 2020-07-29 DIAGNOSIS — Z00.00 ROUTINE GENERAL MEDICAL EXAMINATION AT A HEALTH CARE FACILITY: Primary | ICD-10-CM

## 2020-07-29 DIAGNOSIS — M67.471 GANGLION CYST OF RIGHT FOOT: ICD-10-CM

## 2020-07-29 DIAGNOSIS — F41.9 ANXIETY: ICD-10-CM

## 2020-07-29 DIAGNOSIS — K59.09 CONSTIPATION, CHRONIC: ICD-10-CM

## 2020-07-29 PROCEDURE — 99396 PREV VISIT EST AGE 40-64: CPT | Mod: S$GLB,,, | Performed by: FAMILY MEDICINE

## 2020-07-29 PROCEDURE — 3008F BODY MASS INDEX DOCD: CPT | Mod: CPTII,S$GLB,, | Performed by: FAMILY MEDICINE

## 2020-07-29 PROCEDURE — 99396 PR PREVENTIVE VISIT,EST,40-64: ICD-10-PCS | Mod: S$GLB,,, | Performed by: FAMILY MEDICINE

## 2020-07-29 PROCEDURE — 99999 PR PBB SHADOW E&M-EST. PATIENT-LVL IV: ICD-10-PCS | Mod: PBBFAC,,, | Performed by: FAMILY MEDICINE

## 2020-07-29 PROCEDURE — 99999 PR PBB SHADOW E&M-EST. PATIENT-LVL IV: CPT | Mod: PBBFAC,,, | Performed by: FAMILY MEDICINE

## 2020-07-29 PROCEDURE — 3008F PR BODY MASS INDEX (BMI) DOCUMENTED: ICD-10-PCS | Mod: CPTII,S$GLB,, | Performed by: FAMILY MEDICINE

## 2020-07-29 NOTE — PROGRESS NOTES
Subjective:      Patient ID: Jess Mena is a 59 y.o. female.    Chief Complaint: Annual Exam    Here today for general exam.     HCTZ prn swelling    Valacyclovir prn    She follows with GYN for HRT    lipitor 10 mg qd. No problems. Exercises regularly    BM once a week with milk of magnesia. No problems    Denies any chest pain, shortness of breath, nausea vomiting  diarrhea, blood in stool, heartburn      Current Outpatient Medications:     atorvastatin (LIPITOR) 10 MG tablet, TAKE 1 TABLET DAILY, Disp: 90 tablet, Rfl: 3    buPROPion (WELLBUTRIN XL) 300 MG 24 hr tablet, Take 1 tablet (300 mg total) by mouth once daily., Disp: 90 tablet, Rfl: 4    estradiol 0.05 mg/24 hr td ptsw (VIVELLE-DOT) 0.05 mg/24 hr, APPLY 1 PATCH TO SKIN TWICE WEEKLY, Disp: 24 patch, Rfl: 3    progesterone (PROMETRIUM) 100 MG capsule, Take 1 capsule (100 mg total) by mouth once daily., Disp: 90 capsule, Rfl: 3    valACYclovir (VALTREX) 500 MG tablet, Take 1 tablet (500 mg total) by mouth once daily., Disp: 90 tablet, Rfl: 0    hydroCHLOROthiazide (HYDRODIURIL) 25 MG tablet, Take 1 tablet (25 mg total) by mouth once daily. Prn swelling, Disp: 30 tablet, Rfl: 0      No results found for: HGBA1C  No results found for: MICALBCREAT  Lab Results   Component Value Date    LDLCALC 129.2 07/24/2020    LDLCALC 109 (H) 06/22/2018    CHOL 232 (H) 07/24/2020    HDL 88 (H) 07/24/2020    TRIG 74 07/24/2020       Lab Results   Component Value Date     07/24/2020    K 4.4 07/24/2020     07/24/2020    CO2 26 07/24/2020    GLU 82 07/24/2020    BUN 16 07/24/2020    CREATININE 0.9 07/24/2020    CALCIUM 9.7 07/24/2020    PROT 7.3 07/24/2020    ALBUMIN 4.2 07/24/2020    BILITOT 0.4 07/24/2020    ALKPHOS 49 (L) 07/24/2020    AST 21 07/24/2020    ALT 20 07/24/2020    ANIONGAP 7 (L) 07/24/2020    ESTGFRAFRICA >60.0 07/24/2020    EGFRNONAA >60.0 07/24/2020    WBC 4.71 07/24/2020    HGB 12.7 07/24/2020    HGB 13.1 06/22/2018    HCT 39.4  "07/24/2020     (H) 07/24/2020     07/24/2020    TSH 0.669 05/28/2009       No results found for: LH, FSH, TOTALTESTOST, PROGESTERONE, ESTRADIOL, [id number], [id number], FERRITIN, IRON, TRANSFERRIN, TIBC, FESATURATED, ZINC      Past Medical History:   Diagnosis Date    Anxiety     wellbutrin 300    Constipation     Mixed hyperlipidemia 6/27/2013     Past Surgical History:   Procedure Laterality Date    AUGMENTATION OF BREAST      BREAST AUGMENTATION Bilateral 2012    ENDOMETRIAL ABLATION      NECK MASS EXCISION  2013    Fatty Tumor in neck     Social History     Social History Narrative    Retired  [employer], , 2 children (daughter going to [employer]), nonsmoker, ETOH socially, GYN Derick Bone, colonoscopy 52 yo negative by Dr Lisa Rosario for grandchild, ill parents.        Walks several times a week, weight training.     Family History   Problem Relation Age of Onset    Heart disease Father 60        cad    Hypertension Father     Stroke Father     Hyperlipidemia Father     Hyperlipidemia Mother     Heart disease Mother         asd    Hypertension Mother     No Known Problems Sister     Hyperlipidemia Brother     Hypertension Brother     No Known Problems Daughter     No Known Problems Daughter     Breast cancer Neg Hx     Cancer Neg Hx     Colon cancer Neg Hx     Ovarian cancer Neg Hx     Melanoma Neg Hx      Vitals:    07/29/20 1026   BP: 130/80   Pulse: 62   Temp: 97.9 °F (36.6 °C)   SpO2: 98%   Weight: 72 kg (158 lb 11.7 oz)   Height: 5' 10" (1.778 m)     Objective:   Physical Exam  Constitutional:       Appearance: She is well-developed.   HENT:      Head: Normocephalic and atraumatic.   Eyes:      Pupils: Pupils are equal, round, and reactive to light.   Neck:      Musculoskeletal: Neck supple.      Thyroid: No thyromegaly.   Cardiovascular:      Rate and Rhythm: Normal rate and regular rhythm.      Heart sounds: " Normal heart sounds. No murmur.   Pulmonary:      Effort: Pulmonary effort is normal.      Breath sounds: Normal breath sounds. No wheezing.   Abdominal:      General: Bowel sounds are normal. There is no distension.      Palpations: Abdomen is soft. There is no mass.      Tenderness: There is no abdominal tenderness. There is no guarding or rebound.   Musculoskeletal:      Comments: 1 cm clear tight ganglion cyst RIGHT 3 rd toe PIP   Lymphadenopathy:      Cervical: No cervical adenopathy.   Skin:     General: Skin is warm and dry.   Neurological:      Mental Status: She is alert and oriented to person, place, and time.   Psychiatric:         Behavior: Behavior normal.       Assessment:     1. Routine general medical examination at a health care facility    2. Anxiety    3. Constipation, chronic    4. Genital herpes simplex, unspecified site    5. Periodic edema, initial encounter    6. Ganglion cyst of right foot      Plan:     Orders Placed This Encounter    Ambulatory referral/consult to Podiatry     Follow with GYN for female health & cancer prevention    Continue medications  ==============================  RECOMMENDATIONS FOR FEMALES  ==============================  Your #1 MEDICINE is DAILY EXERCISE - 15-20 minutes of huffing & puffing EVERY DAY.     Prevent the #1 cause of death- cardiovascular disease (HEART ATTACK & STROKE) by checking for normal blood pressure, cholesterol, sugars, & by not smoking.     VACCINES: Yearly FLU shot, PNEUMONIA shot after 65,  SHINGLES shot after 50    Screening colonoscopy at AGE  50 & every 10 years to check for COLON CANCER,  one of the most common & preventable cancers (Or FIT kit yearly) Repeat in 3 years if POLYP found     I recommend  high fiber (5 fresh fruits or vegetables daily), low fat diet and aerobic  exercise (huffing/ puffing/ sweating for 20 min straight at least 4 days a week)    Follow up yearly with mammogram, fasting lipids, CMP, CBC prior.    ==============================================================      There are no Patient Instructions on file for this visit.                            Answers for HPI/ROS submitted by the patient on 7/23/2020   activity change: No  unexpected weight change: No  neck pain: No  hearing loss: Yes  rhinorrhea: No  trouble swallowing: No  eye discharge: No  visual disturbance: No  chest tightness: No  wheezing: No  chest pain: No  palpitations: No  blood in stool: No  constipation: Yes  vomiting: No  diarrhea: No  polydipsia: No  polyuria: No  difficulty urinating: No  hematuria: No  menstrual problem: No  dysuria: No  joint swelling: No  arthralgias: No  headaches: No  weakness: No  confusion: No  dysphoric mood: No

## 2020-07-31 ENCOUNTER — OFFICE VISIT (OUTPATIENT)
Dept: PODIATRY | Facility: CLINIC | Age: 60
End: 2020-07-31
Payer: COMMERCIAL

## 2020-07-31 VITALS
DIASTOLIC BLOOD PRESSURE: 70 MMHG | WEIGHT: 158 LBS | HEIGHT: 70 IN | BODY MASS INDEX: 22.62 KG/M2 | SYSTOLIC BLOOD PRESSURE: 119 MMHG | HEART RATE: 66 BPM

## 2020-07-31 DIAGNOSIS — M67.471 GANGLION CYST OF RIGHT FOOT: Primary | ICD-10-CM

## 2020-07-31 DIAGNOSIS — M72.2 PLANTAR FASCIAL FIBROMATOSIS OF BOTH FEET: ICD-10-CM

## 2020-07-31 PROCEDURE — 20612 ASPIRATE/INJ GANGLION CYST: CPT | Mod: S$GLB,,, | Performed by: PODIATRIST

## 2020-07-31 PROCEDURE — 99999 PR PBB SHADOW E&M-EST. PATIENT-LVL IV: CPT | Mod: PBBFAC,,, | Performed by: PODIATRIST

## 2020-07-31 PROCEDURE — 99214 OFFICE O/P EST MOD 30 MIN: CPT | Mod: 25,S$GLB,, | Performed by: PODIATRIST

## 2020-07-31 PROCEDURE — 3008F PR BODY MASS INDEX (BMI) DOCUMENTED: ICD-10-PCS | Mod: CPTII,S$GLB,, | Performed by: PODIATRIST

## 2020-07-31 PROCEDURE — 99214 PR OFFICE/OUTPT VISIT, EST, LEVL IV, 30-39 MIN: ICD-10-PCS | Mod: 25,S$GLB,, | Performed by: PODIATRIST

## 2020-07-31 PROCEDURE — 20612 PR ASPIRAT/INJECTION GANGLION CYST(S): ICD-10-PCS | Mod: S$GLB,,, | Performed by: PODIATRIST

## 2020-07-31 PROCEDURE — 3008F BODY MASS INDEX DOCD: CPT | Mod: CPTII,S$GLB,, | Performed by: PODIATRIST

## 2020-07-31 PROCEDURE — 99999 PR PBB SHADOW E&M-EST. PATIENT-LVL IV: ICD-10-PCS | Mod: PBBFAC,,, | Performed by: PODIATRIST

## 2020-07-31 RX ORDER — DEXAMETHASONE SODIUM PHOSPHATE 4 MG/ML
4 INJECTION, SOLUTION INTRA-ARTICULAR; INTRALESIONAL; INTRAMUSCULAR; INTRAVENOUS; SOFT TISSUE
Status: COMPLETED | OUTPATIENT
Start: 2020-07-31 | End: 2020-07-31

## 2020-07-31 RX ADMIN — DEXAMETHASONE SODIUM PHOSPHATE 4 MG: 4 INJECTION, SOLUTION INTRA-ARTICULAR; INTRALESIONAL; INTRAMUSCULAR; INTRAVENOUS; SOFT TISSUE at 11:07

## 2020-07-31 NOTE — LETTER
July 31, 2020      Angella Meade MD  [address] [facility] [address]           [place] [facility]  [address]  [facility] [address]  Phone: [phone]  Fax: [phone]          Patient: Jess Mena   MR Number: [id number]   YOB: 1960   Date of Visit: 7/31/2020       Dear Dr. Angella Meade:    Thank you for referring Jess Mena to me for evaluation. Attached you will find relevant portions of my assessment and plan of care.    If you have questions, please do not hesitate to call me. I look forward to following Jess Mena along with you.    Sincerely,    Dominick Guillen, DPDANA    Enclosure  CC:  No Recipients    If you would like to receive this communication electronically, please contact [email or web address] or [phone] to request more information on [employer] access.    For providers and/or their staff who would like to refer a patient to [facility], please contact us through our one-stop-shop provider referral line, [facility], at [phone].    If you feel you have received this communication in error or would no longer like to receive these types of communications, please e-mail [email or web address]

## 2020-07-31 NOTE — PROGRESS NOTES
"Subjective:      Patient ID: Jess Mena is a 59 y.o. female.    Chief Complaint: Ganglion Cyst (Right 3rd toe) and Ankle Injury (Right)    Patient reports greater than 1 year history of a cyst on her right 3rd toe.  She had seen  in January of 2019 where he drained the cyst and injected steroid.  She reports that the cyst was resolved for a little while before filling up with fluid again.  She reports having drain the cyst before on her own.  The cyst is not painful but she reports that it is unsightly.    Vitals:    07/31/20 0939   BP: 119/70   Pulse: 66   Weight: 71.7 kg (158 lb)   Height: 5' 10" (1.778 m)   PainSc: 0-No pain      Past Medical History:   Diagnosis Date    Anxiety     wellbutrin 300    Constipation     Mixed hyperlipidemia 6/27/2013       Past Surgical History:   Procedure Laterality Date    AUGMENTATION OF BREAST      BREAST AUGMENTATION Bilateral 2012    ENDOMETRIAL ABLATION      NECK MASS EXCISION  2013    Fatty Tumor in neck       Family History   Problem Relation Age of Onset    Heart disease Father 60        cad    Hypertension Father     Stroke Father     Hyperlipidemia Father     Hyperlipidemia Mother     Heart disease Mother         asd    Hypertension Mother     No Known Problems Sister     Hyperlipidemia Brother     Hypertension Brother     No Known Problems Daughter     No Known Problems Daughter     Breast cancer Neg Hx     Cancer Neg Hx     Colon cancer Neg Hx     Ovarian cancer Neg Hx     Melanoma Neg Hx        Social History     Socioeconomic History    Marital status:      Spouse name: Not on file    Number of children: 2    Years of education: Not on file    Highest education level: Not on file   Occupational History    Not on file   Social Needs    Financial resource strain: Not on file    Food insecurity     Worry: Not on file     Inability: Not on file    Transportation needs     Medical: Not on file     Non-medical: Not on file "   Tobacco Use    Smoking status: Never Smoker    Smokeless tobacco: Never Used   Substance and Sexual Activity    Alcohol use: Yes     Comment: Occationally - once every few months    Drug use: No    Sexual activity: Yes     Partners: Male     Birth control/protection: Post-menopausal, Surgical     Comment: :  Javi   (BTL- 1995)   Lifestyle    Physical activity     Days per week: Not on file     Minutes per session: Not on file    Stress: Not on file   Relationships    Social connections     Talks on phone: Not on file     Gets together: Not on file     Attends Yazdanism service: Not on file     Active member of club or organization: Not on file     Attends meetings of clubs or organizations: Not on file     Relationship status: Not on file   Other Topics Concern    Are you pregnant or think you may be? 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        Walks several times a week, weight training.       Current Outpatient Medications   Medication Sig Dispense Refill    atorvastatin (LIPITOR) 10 MG tablet TAKE 1 TABLET DAILY 90 tablet 3    buPROPion (WELLBUTRIN XL) 300 MG 24 hr tablet Take 1 tablet (300 mg total) by mouth once daily. 90 tablet 4    estradiol 0.05 mg/24 hr td ptsw (VIVELLE-DOT) 0.05 mg/24 hr APPLY 1 PATCH TO SKIN TWICE WEEKLY 24 patch 3    progesterone (PROMETRIUM) 100 MG capsule Take 1 capsule (100 mg total) by mouth once daily. 90 capsule 3    valACYclovir (VALTREX) 500 MG tablet Take 1 tablet (500 mg total) by mouth once daily. 90 tablet 0    hydroCHLOROthiazide (HYDRODIURIL) 25 MG tablet Take 1 tablet (25 mg total) by mouth once daily. Prn swelling 30 tablet 0     No current facility-administered medications for this  visit.        Review of patient's allergies indicates:  No Known Allergies      Review of Systems   Constitution: Negative for chills, decreased appetite, fever and night sweats.   HENT: Negative for congestion and hearing loss.    Cardiovascular: Negative for chest pain, claudication, cyanosis, irregular heartbeat and leg swelling.   Respiratory: Negative for cough and shortness of breath.    Skin: Negative for color change, dry skin, itching, nail changes, poor wound healing, rash, suspicious lesions and unusual hair distribution.        Cyst right 3rd toe   Musculoskeletal: Negative for arthritis, back pain, falls, gout, joint pain, joint swelling and muscle weakness.   Gastrointestinal: Negative for nausea and vomiting.   Neurological: Negative for dizziness, headaches, loss of balance, numbness and weakness.   Psychiatric/Behavioral: Negative for altered mental status, depression and hallucinations.           Objective:      Physical Exam  Constitutional:       General: She is not in acute distress.     Appearance: Normal appearance. She is not ill-appearing.   Cardiovascular:      Pulses:           Dorsalis pedis pulses are 2+ on the right side and 2+ on the left side.        Posterior tibial pulses are 2+ on the right side and 2+ on the left side.      Comments: CFT brisk < 3s  No edema to the LEs  Hair growth and distribution is normal.    Musculoskeletal:      Comments: Patient has minor HAV deformity with minor digital contractures to the lesser toes bilateral.  Otherwise she has no he equinus contracture.  There is no pain or crepitation with range of motion.  There is no tenderness to palpation about the ankle or the cyst.  No subluxation of the peroneals.   Skin:     General: Skin is warm.      Capillary Refill: Capillary refill takes less than 2 seconds.      Findings: No bruising, ecchymosis or erythema.      Nails: There is no clubbing.        Comments: Patient has age-appropriate skin without signs  of atrophy.    There is a small tense cyst to the right 3rd toe involving the overlying skin with small telangiectasias.  It is independent of motion of the skin.   Neurological:      Mental Status: She is alert and oriented to person, place, and time.      Sensory: Sensation is intact.      Motor: Motor function is intact.      Comments: (-) Tinel's sign  Light touch intact.  MMT 5/5 DF, PF, Inv, Ev  Normal muscle tone.  No signs of atrophy.  No tremor or spasticity.                     Assessment:       Encounter Diagnoses   Name Primary?    Ganglion cyst of right foot Yes    Plantar fascial fibromatosis of both feet          Plan:       Jess was seen today for ganglion cyst and ankle injury.    Diagnoses and all orders for this visit:    Ganglion cyst of right foot  -     Ambulatory referral/consult to Podiatry    Plantar fascial fibromatosis of both feet    Other orders  -     dexamethasone injection 4 mg      I counseled the patient on her conditions, their implications and medical management.    Spoke with patient regarding the plan of care for this recurring right toe cyst.  Any drainage of the cyst would likely result in the cyst recurring.  She will get longer resolution with surgical removal of the cyst though there is a small chance that the cyst could return even with surgical intervention.  She would like to have the cyst drained today while she thinks about surgical plan.  Surgical plan would involve excision of the cyst and possible arthroplasty of the DIPJ with a skin advancement/plasty to fully close the defect.  Risks, benefits anticipated postoperative course were discussed in detail the patient.    Patient to RTC ricky Suarez DPM, PGY-3      07/31/2020  Procedure: Drainage of cyst  Diagnosis: cyst  Supervising provider: Dr. Wilmer SNELL   Performing provider: Dr. Bryce Suarez DPM PGY3    Procedure in detail: A time out was performed following WHO surgical safety checklist guidelines. All  present are in agreement.     Verbal consent was obtained from the patient. Attention to the right 3rd toe. A local block was performed using 2 mL of 1% lidocaine and 0.25% marcain plain.  18 gauge needle was inserted into the cyst and compression was applied.  Clear viscous fluid was expressed from the cyst.  Another small puncture wound was made to further drain the contents.  0.1 mL dexamethasone was injected into the cyst.  Patient tolerated the procedure well.  Appears in was applied over the cyst and a compression dressing was applied.  She was advised to wear this compression dressing for the next day before transitioning to a Band-Aid with antibiotic ointment.    I have personally taken the history and examined this patient and agree with the resident's note as stated as above.   Dominick Guillen DPM, FACFAS

## 2020-07-31 NOTE — PATIENT INSTRUCTIONS
Please fax prescription for topical verapamil cream to [facility].  This is to be used by the patient twice a day to the masses on the bottom of both arches.

## 2020-10-12 RX ORDER — VALACYCLOVIR HYDROCHLORIDE 500 MG/1
500 TABLET, FILM COATED ORAL DAILY
Qty: 90 TABLET | Refills: 0 | Status: SHIPPED | OUTPATIENT
Start: 2020-10-12 | End: 2020-12-23

## 2021-04-05 ENCOUNTER — PATIENT MESSAGE (OUTPATIENT)
Dept: ADMINISTRATIVE | Facility: HOSPITAL | Age: 61
End: 2021-04-05

## 2021-04-15 ENCOUNTER — PATIENT MESSAGE (OUTPATIENT)
Dept: RESEARCH | Facility: HOSPITAL | Age: 61
End: 2021-04-15

## 2021-05-06 ENCOUNTER — PATIENT MESSAGE (OUTPATIENT)
Dept: OBSTETRICS AND GYNECOLOGY | Facility: CLINIC | Age: 61
End: 2021-05-06

## 2021-06-14 ENCOUNTER — TELEPHONE (OUTPATIENT)
Dept: PRIMARY CARE CLINIC | Facility: CLINIC | Age: 61
End: 2021-06-14

## 2021-06-14 DIAGNOSIS — Z12.31 OTHER SCREENING MAMMOGRAM: Primary | ICD-10-CM

## 2021-06-14 RX ORDER — HYDROCHLOROTHIAZIDE 25 MG/1
25 TABLET ORAL DAILY
Qty: 90 TABLET | Refills: 0 | Status: SHIPPED | OUTPATIENT
Start: 2021-06-14 | End: 2021-08-25

## 2021-06-23 ENCOUNTER — APPOINTMENT (OUTPATIENT)
Dept: RADIOLOGY | Facility: OTHER | Age: 61
End: 2021-06-23
Attending: FAMILY MEDICINE
Payer: COMMERCIAL

## 2021-06-23 VITALS — WEIGHT: 158.06 LBS | BODY MASS INDEX: 22.63 KG/M2 | HEIGHT: 70 IN

## 2021-06-23 DIAGNOSIS — Z12.31 OTHER SCREENING MAMMOGRAM: ICD-10-CM

## 2021-06-23 PROCEDURE — 77067 SCR MAMMO BI INCL CAD: CPT | Mod: 26,,, | Performed by: RADIOLOGY

## 2021-06-23 PROCEDURE — 77067 SCR MAMMO BI INCL CAD: CPT | Mod: TC,PN

## 2021-06-23 PROCEDURE — 77063 BREAST TOMOSYNTHESIS BI: CPT | Mod: 26,,, | Performed by: RADIOLOGY

## 2021-06-23 PROCEDURE — 77067 MAMMO DIGITAL SCREENING BILAT WITH TOMO: ICD-10-PCS | Mod: 26,,, | Performed by: RADIOLOGY

## 2021-06-23 PROCEDURE — 77063 MAMMO DIGITAL SCREENING BILAT WITH TOMO: ICD-10-PCS | Mod: 26,,, | Performed by: RADIOLOGY

## 2021-10-05 ENCOUNTER — PATIENT MESSAGE (OUTPATIENT)
Dept: ADMINISTRATIVE | Facility: HOSPITAL | Age: 61
End: 2021-10-05

## 2022-01-18 ENCOUNTER — PATIENT MESSAGE (OUTPATIENT)
Dept: ADMINISTRATIVE | Facility: HOSPITAL | Age: 62
End: 2022-01-18
Payer: COMMERCIAL

## 2022-01-30 ENCOUNTER — PATIENT MESSAGE (OUTPATIENT)
Dept: ADMINISTRATIVE | Facility: HOSPITAL | Age: 62
End: 2022-01-30
Payer: COMMERCIAL

## 2022-04-27 ENCOUNTER — OFFICE VISIT (OUTPATIENT)
Dept: DERMATOLOGY | Facility: CLINIC | Age: 62
End: 2022-04-27
Payer: COMMERCIAL

## 2022-04-27 VITALS — BODY MASS INDEX: 22.67 KG/M2 | WEIGHT: 158 LBS

## 2022-04-27 DIAGNOSIS — D22.9 NEVUS OF MULTIPLE SITES: ICD-10-CM

## 2022-04-27 DIAGNOSIS — L82.1 SEBORRHEIC KERATOSES: Primary | ICD-10-CM

## 2022-04-27 DIAGNOSIS — L81.4 LENTIGINES: ICD-10-CM

## 2022-04-27 DIAGNOSIS — Z12.83 SKIN EXAM, SCREENING FOR CANCER: ICD-10-CM

## 2022-04-27 PROCEDURE — 1159F MED LIST DOCD IN RCRD: CPT | Mod: CPTII,S$GLB,, | Performed by: DERMATOLOGY

## 2022-04-27 PROCEDURE — 99999 PR PBB SHADOW E&M-EST. PATIENT-LVL III: CPT | Mod: PBBFAC,,, | Performed by: DERMATOLOGY

## 2022-04-27 PROCEDURE — 99999 PR PBB SHADOW E&M-EST. PATIENT-LVL III: ICD-10-PCS | Mod: PBBFAC,,, | Performed by: DERMATOLOGY

## 2022-04-27 PROCEDURE — 99213 PR OFFICE/OUTPT VISIT, EST, LEVL III, 20-29 MIN: ICD-10-PCS | Mod: 25,S$GLB,, | Performed by: DERMATOLOGY

## 2022-04-27 PROCEDURE — 3008F PR BODY MASS INDEX (BMI) DOCUMENTED: ICD-10-PCS | Mod: CPTII,S$GLB,, | Performed by: DERMATOLOGY

## 2022-04-27 PROCEDURE — 17110 DESTRUCTION B9 LES UP TO 14: CPT | Mod: S$GLB,,, | Performed by: DERMATOLOGY

## 2022-04-27 PROCEDURE — 1159F PR MEDICATION LIST DOCUMENTED IN MEDICAL RECORD: ICD-10-PCS | Mod: CPTII,S$GLB,, | Performed by: DERMATOLOGY

## 2022-04-27 PROCEDURE — 99213 OFFICE O/P EST LOW 20 MIN: CPT | Mod: 25,S$GLB,, | Performed by: DERMATOLOGY

## 2022-04-27 PROCEDURE — 1160F PR REVIEW ALL MEDS BY PRESCRIBER/CLIN PHARMACIST DOCUMENTED: ICD-10-PCS | Mod: CPTII,S$GLB,, | Performed by: DERMATOLOGY

## 2022-04-27 PROCEDURE — 17110 PR DESTRUCTION BENIGN LESIONS UP TO 14: ICD-10-PCS | Mod: S$GLB,,, | Performed by: DERMATOLOGY

## 2022-04-27 PROCEDURE — 1160F RVW MEDS BY RX/DR IN RCRD: CPT | Mod: CPTII,S$GLB,, | Performed by: DERMATOLOGY

## 2022-04-27 PROCEDURE — 3008F BODY MASS INDEX DOCD: CPT | Mod: CPTII,S$GLB,, | Performed by: DERMATOLOGY

## 2022-04-27 NOTE — PROGRESS NOTES
Subjective:       Patient ID:  Jess Mena is a 61 y.o. female who presents for   Chief Complaint   Patient presents with    Mole     Roach, several days, itching, raised     Skin Check     UBSE     HPI    Review of Systems   Constitutional: Negative for fever, chills, weight loss, weight gain, fatigue, night sweats and malaise.   Skin: Positive for daily sunscreen use, activity-related sunscreen use and wears hat.   Hematologic/Lymphatic: Does not bruise/bleed easily.        Objective:    Physical Exam   Constitutional: She appears well-developed and well-nourished.   Neurological: She is alert and oriented to person, place, and time.   Psychiatric: She has a normal mood and affect.          Diagram Legend     Erythematous scaling macule/papule c/w actinic keratosis       Vascular papule c/w angioma      Pigmented verrucoid papule/plaque c/w seborrheic keratosis      Yellow umbilicated papule c/w sebaceous hyperplasia      Irregularly shaped tan macule c/w lentigo     1-2 mm smooth white papules consistent with Milia      Movable subcutaneous cyst with punctum c/w epidermal inclusion cyst      Subcutaneous movable cyst c/w pilar cyst      Firm pink to brown papule c/w dermatofibroma      Pedunculated fleshy papule(s) c/w skin tag(s)      Evenly pigmented macule c/w junctional nevus     Mildly variegated pigmented, slightly irregular-bordered macule c/w mildly atypical nevus      Flesh colored to evenly pigmented papule c/w intradermal nevus       Pink pearly papule/plaque c/w basal cell carcinoma      Erythematous hyperkeratotic cursted plaque c/w SCC      Surgical scar with no sign of skin cancer recurrence      Open and closed comedones      Inflammatory papules and pustules      Verrucoid papule consistent consistent with wart     Erythematous eczematous patches and plaques     Dystrophic onycholytic nail with subungual debris c/w onychomycosis     Umbilicated papule    Erythematous-base heme-crusted tan  verrucoid plaque consistent with inflamed seborrheic keratosis     Erythematous Silvery Scaling Plaque c/w Psoriasis     See annotation      Assessment / Plan:        There are no diagnoses linked to this encounter.         No follow-ups on file.

## 2022-04-27 NOTE — PROGRESS NOTES
Subjective:       Patient ID:  Jess Mena is a 61 y.o. female who presents for   Chief Complaint   Patient presents with    Skin Check     UBSE     Lesion on right lower back which peeled off some last week, would like skin check.       Review of Systems   Constitutional: Negative for fever.   Skin: Negative for itching and rash.   Hematologic/Lymphatic: Does not bruise/bleed easily.        Objective:    Physical Exam   Constitutional: She appears well-developed and well-nourished. No distress.   Neurological: She is alert and oriented to person, place, and time. She is not disoriented.   Psychiatric: She has a normal mood and affect.   Skin:   Areas Examined (abnormalities noted in diagram):   Head / Face Inspection Performed  Neck Inspection Performed  Chest / Axilla Inspection Performed  Abdomen Inspection Performed  Back Inspection Performed  RUE Inspected  LUE Inspection Performed                   Diagram Legend     Erythematous scaling macule/papule c/w actinic keratosis       Vascular papule c/w angioma      Pigmented verrucoid papule/plaque c/w seborrheic keratosis      Yellow umbilicated papule c/w sebaceous hyperplasia      Irregularly shaped tan macule c/w lentigo     1-2 mm smooth white papules consistent with Milia      Movable subcutaneous cyst with punctum c/w epidermal inclusion cyst      Subcutaneous movable cyst c/w pilar cyst      Firm pink to brown papule c/w dermatofibroma      Pedunculated fleshy papule(s) c/w skin tag(s)      Evenly pigmented macule c/w junctional nevus     Mildly variegated pigmented, slightly irregular-bordered macule c/w mildly atypical nevus      Flesh colored to evenly pigmented papule c/w intradermal nevus       Pink pearly papule/plaque c/w basal cell carcinoma      Erythematous hyperkeratotic cursted plaque c/w SCC      Surgical scar with no sign of skin cancer recurrence      Open and closed comedones      Inflammatory papules and pustules      Verrucoid  "papule consistent consistent with wart     Erythematous eczematous patches and plaques     Dystrophic onycholytic nail with subungual debris c/w onychomycosis     Umbilicated papule    Erythematous-base heme-crusted tan verrucoid plaque consistent with inflamed seborrheic keratosis     Erythematous Silvery Scaling Plaque c/w Psoriasis     See annotation      Assessment / Plan:        Seborrheic keratoses  Cryosurgery procedure note:    Verbal consent from the patient is obtained including, but not limited to, risk of hypopigmentation/hyperpigmentation, scar, recurrence of lesion. Liquid nitrogen cryosurgery is applied to 2 lesions right lower back and left chest to produce a freeze injury, is instructed to call if lesions do not completely resolve.        Nevus of multiple sites  The "ABCD" rules to observe pigmented lesions were reviewed.  Brochure provided      Lentigines  The "ABCD" rules to observe pigmented lesions were reviewed.  sunscreen    Skin exam, screening for cancer  . No lesions suspicious for malignancy noted.    Recommend daily sun protection/avoidance and use of at least SPF 30, broad spectrum sunscreen (OTC drug).              Follow up in about 2 years (around 4/27/2024).    "

## 2022-06-27 RX ORDER — ATORVASTATIN CALCIUM 10 MG/1
TABLET, FILM COATED ORAL
Qty: 90 TABLET | Refills: 0 | Status: SHIPPED | OUTPATIENT
Start: 2022-06-27 | End: 2022-09-26

## 2022-06-27 NOTE — TELEPHONE ENCOUNTER
Care Due:                  Date            Visit Type   Department     Provider  --------------------------------------------------------------------------------                                PHYSICAL -                              ESTABLISHED   [facility] PR[facility]  Last Visit: 07-      PATIENT      CARE           Angella Meade                              [facility]                              ANNUAL                              CHECKUP/PHY  [facility]  Next Visit: 06-      S            CARE           Angella Meade                                                            Last  Test          Frequency    Reason                     Performed    Due Date  --------------------------------------------------------------------------------    CMP.........  12 months..  atorvastatin,              07- 07-                             hydroCHLOROthiazide......    Lipid Panel.  12 months..  atorvastatin.............  07- 07-    [employer] Embedded Care Gaps. Reference number: [id number]. 6/26/2022   11:52:41 PM CDT

## 2022-06-29 ENCOUNTER — OFFICE VISIT (OUTPATIENT)
Dept: PRIMARY CARE CLINIC | Facility: CLINIC | Age: 62
End: 2022-06-29
Payer: COMMERCIAL

## 2022-06-29 VITALS
TEMPERATURE: 98 F | HEART RATE: 60 BPM | WEIGHT: 155 LBS | OXYGEN SATURATION: 99 % | BODY MASS INDEX: 22.19 KG/M2 | DIASTOLIC BLOOD PRESSURE: 86 MMHG | SYSTOLIC BLOOD PRESSURE: 112 MMHG | HEIGHT: 70 IN

## 2022-06-29 DIAGNOSIS — F41.9 ANXIETY: ICD-10-CM

## 2022-06-29 DIAGNOSIS — E78.2 MIXED HYPERLIPIDEMIA: ICD-10-CM

## 2022-06-29 DIAGNOSIS — Z12.11 SCREEN FOR COLON CANCER: ICD-10-CM

## 2022-06-29 DIAGNOSIS — Z00.00 ROUTINE GENERAL MEDICAL EXAMINATION AT A HEALTH CARE FACILITY: Primary | ICD-10-CM

## 2022-06-29 DIAGNOSIS — A60.00 GENITAL HERPES SIMPLEX, UNSPECIFIED SITE: ICD-10-CM

## 2022-06-29 DIAGNOSIS — K59.09 CONSTIPATION, CHRONIC: ICD-10-CM

## 2022-06-29 PROCEDURE — 1160F PR REVIEW ALL MEDS BY PRESCRIBER/CLIN PHARMACIST DOCUMENTED: ICD-10-PCS | Mod: CPTII,S$GLB,, | Performed by: FAMILY MEDICINE

## 2022-06-29 PROCEDURE — 3074F SYST BP LT 130 MM HG: CPT | Mod: CPTII,S$GLB,, | Performed by: FAMILY MEDICINE

## 2022-06-29 PROCEDURE — 1159F PR MEDICATION LIST DOCUMENTED IN MEDICAL RECORD: ICD-10-PCS | Mod: CPTII,S$GLB,, | Performed by: FAMILY MEDICINE

## 2022-06-29 PROCEDURE — 99396 PREV VISIT EST AGE 40-64: CPT | Mod: S$GLB,,, | Performed by: FAMILY MEDICINE

## 2022-06-29 PROCEDURE — 99396 PR PREVENTIVE VISIT,EST,40-64: ICD-10-PCS | Mod: S$GLB,,, | Performed by: FAMILY MEDICINE

## 2022-06-29 PROCEDURE — 3079F DIAST BP 80-89 MM HG: CPT | Mod: CPTII,S$GLB,, | Performed by: FAMILY MEDICINE

## 2022-06-29 PROCEDURE — 3008F PR BODY MASS INDEX (BMI) DOCUMENTED: ICD-10-PCS | Mod: CPTII,S$GLB,, | Performed by: FAMILY MEDICINE

## 2022-06-29 PROCEDURE — 3074F PR MOST RECENT SYSTOLIC BLOOD PRESSURE < 130 MM HG: ICD-10-PCS | Mod: CPTII,S$GLB,, | Performed by: FAMILY MEDICINE

## 2022-06-29 PROCEDURE — 1160F RVW MEDS BY RX/DR IN RCRD: CPT | Mod: CPTII,S$GLB,, | Performed by: FAMILY MEDICINE

## 2022-06-29 PROCEDURE — 1159F MED LIST DOCD IN RCRD: CPT | Mod: CPTII,S$GLB,, | Performed by: FAMILY MEDICINE

## 2022-06-29 PROCEDURE — 3008F BODY MASS INDEX DOCD: CPT | Mod: CPTII,S$GLB,, | Performed by: FAMILY MEDICINE

## 2022-06-29 PROCEDURE — 99999 PR PBB SHADOW E&M-EST. PATIENT-LVL IV: CPT | Mod: PBBFAC,,, | Performed by: FAMILY MEDICINE

## 2022-06-29 PROCEDURE — 99999 PR PBB SHADOW E&M-EST. PATIENT-LVL IV: ICD-10-PCS | Mod: PBBFAC,,, | Performed by: FAMILY MEDICINE

## 2022-06-29 PROCEDURE — 3079F PR MOST RECENT DIASTOLIC BLOOD PRESSURE 80-89 MM HG: ICD-10-PCS | Mod: CPTII,S$GLB,, | Performed by: FAMILY MEDICINE

## 2022-06-29 RX ORDER — VALACYCLOVIR HYDROCHLORIDE 500 MG/1
500 TABLET, FILM COATED ORAL DAILY
Qty: 90 TABLET | Refills: 0 | Status: SHIPPED | OUTPATIENT
Start: 2022-06-29 | End: 2023-05-03 | Stop reason: SDUPTHER

## 2022-06-29 NOTE — PROGRESS NOTES
Subjective:      Patient ID: Jess Mena is a 61 y.o. female.    Chief Complaint: Annual Exam    Here today for general exam.     Wellbutrin 300 works well for anxiety    She recently was tx by Dermatologist for skin lesions. noncancerous    Follows with GYN  Bone - tx with VIvelle dot & progesterone    BP normal with HCTZ 25, takes for swelling    Takes Lipitor for HLD,  No side effects    Takes Valtrex very rarely for  herpes flares    Denies any chest pain, shortness of breath, nausea vomiting constipation diarrhea, blood in stool, heartburn    Current Outpatient Medications   Medication Instructions    atorvastatin (LIPITOR) 10 MG tablet TAKE 1 TABLET DAILY    buPROPion (WELLBUTRIN XL) 300 MG 24 hr tablet TAKE 1 TABLET DAILY    estradiol 0.05 mg/24 hr td ptsw (VIVELLE-DOT) 0.05 mg/24 hr APPLY 1 PATCH TO SKIN TWICE WEEKLY    hydroCHLOROthiazide (HYDRODIURIL) 25 MG tablet TAKE 1 TABLET DAILY AS NEEDED FOR SWELLING    progesterone (PROMETRIUM) 100 MG capsule TAKE 1 CAPSULE DAILY    valACYclovir (VALTREX) 500 mg, Oral, Daily       No results found for: HGBA1C  No results found for: MICALBCREAT  Lab Results   Component Value Date    LDLCALC 129.2 07/24/2020    LDLCALC 109 (H) 06/22/2018    CHOL 232 (H) 07/24/2020    HDL 88 (H) 07/24/2020    TRIG 74 07/24/2020       Lab Results   Component Value Date     07/24/2020    K 4.4 07/24/2020     07/24/2020    CO2 26 07/24/2020    GLU 82 07/24/2020    BUN 16 07/24/2020    CREATININE 0.9 07/24/2020    CALCIUM 9.7 07/24/2020    PROT 7.3 07/24/2020    ALBUMIN 4.2 07/24/2020    BILITOT 0.4 07/24/2020    ALKPHOS 49 (L) 07/24/2020    AST 21 07/24/2020    ALT 20 07/24/2020    ANIONGAP 7 (L) 07/24/2020    ESTGFRAFRICA >60.0 07/24/2020    EGFRNONAA >60.0 07/24/2020    WBC 4.71 07/24/2020    HGB 12.7 07/24/2020    HGB 13.1 06/22/2018    HCT 39.4 07/24/2020     (H) 07/24/2020     07/24/2020    TSH 0.669 05/28/2009       No results found for: LH, FSH,  "TOTALTESTOST, PROGESTERONE, ESTRADIOL, UBOGNPZX74QT, NOBHVAOG08, FERRITIN, IRON, TRANSFERRIN, TIBC, FESATURATED, ZINC      Past Medical History:   Diagnosis Date    Anxiety     wellbutrin 300    Constipation     Mixed hyperlipidemia 6/27/2013     Past Surgical History:   Procedure Laterality Date    AUGMENTATION OF BREAST      BREAST AUGMENTATION Bilateral 2012    ENDOMETRIAL ABLATION      NECK MASS EXCISION  2013    Fatty Tumor in neck     Social History     Social History Narrative    Not on file     Family History   Problem Relation Age of Onset    Heart disease Father 60        cad    Hypertension Father     Stroke Father     Hyperlipidemia Father     Hyperlipidemia Mother     Heart disease Mother         asd    Hypertension Mother     No Known Problems Sister     Hyperlipidemia Brother     Hypertension Brother     No Known Problems Daughter     No Known Problems Daughter     Breast cancer Neg Hx     Cancer Neg Hx     Colon cancer Neg Hx     Ovarian cancer Neg Hx     Melanoma Neg Hx      Vitals:    06/29/22 1019   BP: 112/86   Pulse: 60   Temp: 98.1 °F (36.7 °C)   SpO2: 99%   Weight: 70.3 kg (154 lb 15.7 oz)   Height: 5' 10" (1.778 m)   PainSc: 0-No pain     Objective:   Physical Exam  Constitutional:       Appearance: She is well-developed.   HENT:      Head: Normocephalic and atraumatic.      Nose:      Comments: Wearing mask due to current COVID 19 pandemic, Nose & mouth exam deferred  Eyes:      Pupils: Pupils are equal, round, and reactive to light.   Neck:      Thyroid: No thyromegaly.   Cardiovascular:      Rate and Rhythm: Normal rate and regular rhythm.      Heart sounds: Normal heart sounds. No murmur heard.  Pulmonary:      Effort: Pulmonary effort is normal.      Breath sounds: Normal breath sounds. No wheezing.   Abdominal:      General: Bowel sounds are normal. There is no distension.      Palpations: Abdomen is soft. There is no mass.      Tenderness: There is no abdominal " tenderness. There is no guarding or rebound.   Musculoskeletal:      Cervical back: Neck supple.   Lymphadenopathy:      Cervical: No cervical adenopathy.   Skin:     General: Skin is warm and dry.   Neurological:      Mental Status: She is alert and oriented to person, place, and time.   Psychiatric:         Behavior: Behavior normal.       Assessment:     1. Routine general medical examination at a health care facility    2. Mixed hyperlipidemia    3. Anxiety    4. Genital herpes simplex, unspecified site    5. Screen for colon cancer    6. Constipation, chronic      Plan:     Orders Placed This Encounter    Cologuard Screening (Multitarget Stool DNA)    CBC Auto Differential    Comprehensive Metabolic Panel    Lipid Panel    Hemoglobin A1C    valACYclovir (VALTREX) 500 MG tablet     Follow with GYN for female health & cancer prevention      ==============================  RECOMMENDATIONS FOR FEMALES  ==============================  Your #1 MEDICINE is DAILY EXERCISE - 15-20 minutes of huffing & puffing EVERY DAY.     Prevent the #1 cause of death- cardiovascular disease (HEART ATTACK & STROKE) by checking for normal blood pressure, cholesterol, sugars, & by not smoking.     VACCINES: Yearly FLU shot, PNEUMONIA shot after 65,  SHINGLES shot after 50    COLON CANCER screening colonoscopy starting at 44 yo &  every 10 years (or Cologuard kit every 3 yrs) , repeat test sooner if POLYP is found    I recommend  high fiber (5 fresh fruits or vegetables daily), low fat diet and aerobic  exercise (huffing/ puffing/ sweating for 20 min straight at least 4 days a week)    Follow up yearly with mammogram, fasting lipids, CMP, CBC prior.   ==============================================================      Patient Instructions   She will call GYN DR Villavicencio for appt, as pap smear due 2022                              Answers for HPI/ROS submitted by the patient on 6/27/2022  activity change: No  unexpected weight change:  No  neck pain: No  hearing loss: No  rhinorrhea: No  trouble swallowing: No  eye discharge: No  visual disturbance: No  chest tightness: No  wheezing: No  chest pain: No  palpitations: No  blood in stool: No  constipation: No  vomiting: No  diarrhea: No  polydipsia: No  polyuria: No  difficulty urinating: No  hematuria: No  menstrual problem: No  dysuria: No  joint swelling: No  arthralgias: No  headaches: No  weakness: No  confusion: No  dysphoric mood: No

## 2022-06-30 ENCOUNTER — LAB VISIT (OUTPATIENT)
Dept: LAB | Facility: HOSPITAL | Age: 62
End: 2022-06-30
Attending: FAMILY MEDICINE
Payer: COMMERCIAL

## 2022-06-30 DIAGNOSIS — Z00.00 ROUTINE GENERAL MEDICAL EXAMINATION AT A HEALTH CARE FACILITY: ICD-10-CM

## 2022-06-30 LAB
ALBUMIN SERPL BCP-MCNC: 4 G/DL (ref 3.5–5.2)
ALP SERPL-CCNC: 57 U/L (ref 55–135)
ALT SERPL W/O P-5'-P-CCNC: 19 U/L (ref 10–44)
ANION GAP SERPL CALC-SCNC: 8 MMOL/L (ref 8–16)
AST SERPL-CCNC: 20 U/L (ref 10–40)
BASOPHILS # BLD AUTO: 0.04 K/UL (ref 0–0.2)
BASOPHILS NFR BLD: 0.9 % (ref 0–1.9)
BILIRUB SERPL-MCNC: 0.3 MG/DL (ref 0.1–1)
BUN SERPL-MCNC: 16 MG/DL (ref 6–20)
CALCIUM SERPL-MCNC: 9.4 MG/DL (ref 8.7–10.5)
CHLORIDE SERPL-SCNC: 108 MMOL/L (ref 95–110)
CHOLEST SERPL-MCNC: 221 MG/DL (ref 120–199)
CHOLEST/HDLC SERPL: 2.7 {RATIO} (ref 2–5)
CO2 SERPL-SCNC: 25 MMOL/L (ref 23–29)
CREAT SERPL-MCNC: 0.9 MG/DL (ref 0.5–1.4)
DIFFERENTIAL METHOD: ABNORMAL
EOSINOPHIL # BLD AUTO: 0.1 K/UL (ref 0–0.5)
EOSINOPHIL NFR BLD: 2.6 % (ref 0–8)
ERYTHROCYTE [DISTWIDTH] IN BLOOD BY AUTOMATED COUNT: 12.7 % (ref 11.5–14.5)
EST. GFR  (AFRICAN AMERICAN): >60 ML/MIN/1.73 M^2
EST. GFR  (NON AFRICAN AMERICAN): >60 ML/MIN/1.73 M^2
ESTIMATED AVG GLUCOSE: 105 MG/DL (ref 68–131)
GLUCOSE SERPL-MCNC: 83 MG/DL (ref 70–110)
HBA1C MFR BLD: 5.3 % (ref 4–5.6)
HCT VFR BLD AUTO: 40.6 % (ref 37–48.5)
HDLC SERPL-MCNC: 82 MG/DL (ref 40–75)
HDLC SERPL: 37.1 % (ref 20–50)
HGB BLD-MCNC: 12.8 G/DL (ref 12–16)
IMM GRANULOCYTES # BLD AUTO: 0.01 K/UL (ref 0–0.04)
IMM GRANULOCYTES NFR BLD AUTO: 0.2 % (ref 0–0.5)
LDLC SERPL CALC-MCNC: 128.2 MG/DL (ref 63–159)
LYMPHOCYTES # BLD AUTO: 1.4 K/UL (ref 1–4.8)
LYMPHOCYTES NFR BLD: 31.2 % (ref 18–48)
MCH RBC QN AUTO: 32.1 PG (ref 27–31)
MCHC RBC AUTO-ENTMCNC: 31.5 G/DL (ref 32–36)
MCV RBC AUTO: 102 FL (ref 82–98)
MONOCYTES # BLD AUTO: 0.4 K/UL (ref 0.3–1)
MONOCYTES NFR BLD: 9.2 % (ref 4–15)
NEUTROPHILS # BLD AUTO: 2.5 K/UL (ref 1.8–7.7)
NEUTROPHILS NFR BLD: 55.9 % (ref 38–73)
NONHDLC SERPL-MCNC: 139 MG/DL
NRBC BLD-RTO: 0 /100 WBC
PLATELET # BLD AUTO: 295 K/UL (ref 150–450)
PMV BLD AUTO: 11.2 FL (ref 9.2–12.9)
POTASSIUM SERPL-SCNC: 4.3 MMOL/L (ref 3.5–5.1)
PROT SERPL-MCNC: 6.8 G/DL (ref 6–8.4)
RBC # BLD AUTO: 3.99 M/UL (ref 4–5.4)
SODIUM SERPL-SCNC: 141 MMOL/L (ref 136–145)
TRIGL SERPL-MCNC: 54 MG/DL (ref 30–150)
WBC # BLD AUTO: 4.55 K/UL (ref 3.9–12.7)

## 2022-06-30 PROCEDURE — 80053 COMPREHEN METABOLIC PANEL: CPT | Performed by: FAMILY MEDICINE

## 2022-06-30 PROCEDURE — 85025 COMPLETE CBC W/AUTO DIFF WBC: CPT | Performed by: FAMILY MEDICINE

## 2022-06-30 PROCEDURE — 80061 LIPID PANEL: CPT | Performed by: FAMILY MEDICINE

## 2022-06-30 PROCEDURE — 83036 HEMOGLOBIN GLYCOSYLATED A1C: CPT | Performed by: FAMILY MEDICINE

## 2022-06-30 PROCEDURE — 36415 COLL VENOUS BLD VENIPUNCTURE: CPT | Mod: PO | Performed by: FAMILY MEDICINE

## 2022-07-22 ENCOUNTER — PATIENT OUTREACH (OUTPATIENT)
Dept: ADMINISTRATIVE | Facility: HOSPITAL | Age: 62
End: 2022-07-22
Payer: COMMERCIAL

## 2022-07-22 NOTE — PROGRESS NOTES
Patient due for the following    COVID-19 Vaccine (1)    Colorectal Cancer Screening     Cervical Cancer Screening       E-faxed Dr. Macedo for most recent c-scope records    Immunizations: reviewed and updated  Care Everywhere: triggered  Care Teams: up to date  Outreach: none needed

## 2022-07-22 NOTE — LETTER
AUTHORIZATION FOR RELEASE OF   CONFIDENTIAL INFORMATION    Dear Dr. Macedo,    We are seeing Jess Mena, date of birth 1960, in the clinic at [facility]. Angella Meade MD is the patient's PCP. Jess Mena has an outstanding lab/procedure at the time we reviewed her chart. In order to help keep her health information updated, she has authorized us to request the following medical record(s):        (  )  MAMMOGRAM                                      ( X )  COLONOSCOPY      (  )  PAP SMEAR                                          (  )  OUTSIDE LAB RESULTS     (  )  DEXA SCAN                                          (  )  EYE EXAM            (  )  FOOT EXAM                                          (  )  ENTIRE RECORD     (  )  OUTSIDE IMMUNIZATIONS                 (  )  _______________         Please fax records to [facility], Tara Berner, MD, [phone]     If you have any questions, please contact Tessy at [phone].           Patient Name: Jess Mena  : 1960  Patient Phone #: [phone]

## 2022-07-28 ENCOUNTER — PATIENT OUTREACH (OUTPATIENT)
Dept: ADMINISTRATIVE | Facility: HOSPITAL | Age: 62
End: 2022-07-28
Payer: COMMERCIAL

## 2022-07-28 ENCOUNTER — PATIENT MESSAGE (OUTPATIENT)
Dept: ADMINISTRATIVE | Facility: HOSPITAL | Age: 62
End: 2022-07-28
Payer: COMMERCIAL

## 2022-07-28 NOTE — PROGRESS NOTES
Patient due for the following    COVID-19 Vaccine (1)    Cervical Cancer Screening      Received c-scope records  Scanned to media.  updated     Immunizations: reviewed and updated  Care Everywhere: triggered  Care Teams: up to date  Outreach: none needed

## 2022-09-02 LAB — NONINV COLON CA DNA+OCC BLD SCRN STL QL: NEGATIVE

## 2023-04-03 ENCOUNTER — PATIENT MESSAGE (OUTPATIENT)
Dept: ADMINISTRATIVE | Facility: HOSPITAL | Age: 63
End: 2023-04-03
Payer: COMMERCIAL

## 2023-05-03 NOTE — TELEPHONE ENCOUNTER
Care Due:                  Date            Visit Type   Department     Provider  --------------------------------------------------------------------------------                                MYCHART                              ANNUAL                              CHECKUP/PHY  [facility]  Last Visit: 06-      [facility]           Angella Meade  Next Visit: None Scheduled  None         None Found                                                            Last  Test          Frequency    Reason                     Performed    Due Date  --------------------------------------------------------------------------------    Office Visit  12 months..  atorvastatin,              06- 06-                             hydroCHLOROthiazide,                             valACYclovir.............    CBC.........  12 months..  valACYclovir.............  06- 06-    CMP.........  12 months..  atorvastatin,              06- 06-                             hydroCHLOROthiazide,                             valACYclovir.............    Lipid Panel.  12 months..  atorvastatin.............  06- 06-    Health [facility] Embedded Care Due Messages. Reference number: [id number].   5/03/2023 4:27:34 PM CDT

## 2023-05-04 RX ORDER — VALACYCLOVIR HYDROCHLORIDE 500 MG/1
500 TABLET, FILM COATED ORAL DAILY
Qty: 90 TABLET | Refills: 0 | Status: SHIPPED | OUTPATIENT
Start: 2023-05-04

## 2023-06-08 ENCOUNTER — PATIENT MESSAGE (OUTPATIENT)
Dept: PRIMARY CARE CLINIC | Facility: CLINIC | Age: 63
End: 2023-06-08
Payer: COMMERCIAL

## 2023-06-19 ENCOUNTER — PATIENT MESSAGE (OUTPATIENT)
Dept: OBSTETRICS AND GYNECOLOGY | Facility: CLINIC | Age: 63
End: 2023-06-19
Payer: COMMERCIAL

## 2023-06-19 ENCOUNTER — PATIENT MESSAGE (OUTPATIENT)
Dept: PRIMARY CARE CLINIC | Facility: CLINIC | Age: 63
End: 2023-06-19
Payer: COMMERCIAL

## 2023-06-19 DIAGNOSIS — K21.9 GASTROESOPHAGEAL REFLUX DISEASE, UNSPECIFIED WHETHER ESOPHAGITIS PRESENT: Primary | ICD-10-CM

## 2023-06-19 DIAGNOSIS — Z12.31 BREAST CANCER SCREENING BY MAMMOGRAM: Primary | ICD-10-CM

## 2023-06-20 ENCOUNTER — PATIENT MESSAGE (OUTPATIENT)
Dept: PRIMARY CARE CLINIC | Facility: CLINIC | Age: 63
End: 2023-06-20
Payer: COMMERCIAL

## 2023-06-20 ENCOUNTER — PATIENT OUTREACH (OUTPATIENT)
Dept: ADMINISTRATIVE | Facility: HOSPITAL | Age: 63
End: 2023-06-20
Payer: COMMERCIAL

## 2023-06-20 NOTE — PROGRESS NOTES
Health Maintenance Due   Topic Date Due    COVID-19 Vaccine (1) Never done    Cervical Cancer Screening  01/09/2022        Chart review done.   HM updated.   Immunizations reviewed & updated.   Care Everywhere updated.

## 2023-06-22 ENCOUNTER — APPOINTMENT (OUTPATIENT)
Dept: RADIOLOGY | Facility: OTHER | Age: 63
End: 2023-06-22
Attending: OBSTETRICS & GYNECOLOGY
Payer: COMMERCIAL

## 2023-06-22 ENCOUNTER — OFFICE VISIT (OUTPATIENT)
Dept: PRIMARY CARE CLINIC | Facility: CLINIC | Age: 63
End: 2023-06-22
Payer: COMMERCIAL

## 2023-06-22 VITALS
WEIGHT: 154.31 LBS | TEMPERATURE: 99 F | DIASTOLIC BLOOD PRESSURE: 60 MMHG | HEART RATE: 83 BPM | HEIGHT: 70 IN | BODY MASS INDEX: 22.09 KG/M2 | SYSTOLIC BLOOD PRESSURE: 102 MMHG | OXYGEN SATURATION: 98 %

## 2023-06-22 DIAGNOSIS — R19.5 LOOSE STOOLS: Primary | ICD-10-CM

## 2023-06-22 DIAGNOSIS — K21.9 GASTROESOPHAGEAL REFLUX DISEASE, UNSPECIFIED WHETHER ESOPHAGITIS PRESENT: ICD-10-CM

## 2023-06-22 DIAGNOSIS — Z12.31 BREAST CANCER SCREENING BY MAMMOGRAM: ICD-10-CM

## 2023-06-22 DIAGNOSIS — Z00.00 ROUTINE GENERAL MEDICAL EXAMINATION AT A HEALTH CARE FACILITY: ICD-10-CM

## 2023-06-22 PROCEDURE — 77063 BREAST TOMOSYNTHESIS BI: CPT | Mod: 26,,, | Performed by: RADIOLOGY

## 2023-06-22 PROCEDURE — 77067 MAMMO DIGITAL SCREENING BILAT WITH TOMO: ICD-10-PCS | Mod: 26,,, | Performed by: RADIOLOGY

## 2023-06-22 PROCEDURE — 3074F SYST BP LT 130 MM HG: CPT | Mod: CPTII,S$GLB,, | Performed by: FAMILY MEDICINE

## 2023-06-22 PROCEDURE — 1160F RVW MEDS BY RX/DR IN RCRD: CPT | Mod: CPTII,S$GLB,, | Performed by: FAMILY MEDICINE

## 2023-06-22 PROCEDURE — 99214 PR OFFICE/OUTPT VISIT, EST, LEVL IV, 30-39 MIN: ICD-10-PCS | Mod: S$GLB,,, | Performed by: FAMILY MEDICINE

## 2023-06-22 PROCEDURE — 77067 SCR MAMMO BI INCL CAD: CPT | Mod: TC,PN

## 2023-06-22 PROCEDURE — 1159F PR MEDICATION LIST DOCUMENTED IN MEDICAL RECORD: ICD-10-PCS | Mod: CPTII,S$GLB,, | Performed by: FAMILY MEDICINE

## 2023-06-22 PROCEDURE — 77063 MAMMO DIGITAL SCREENING BILAT WITH TOMO: ICD-10-PCS | Mod: 26,,, | Performed by: RADIOLOGY

## 2023-06-22 PROCEDURE — 3008F BODY MASS INDEX DOCD: CPT | Mod: CPTII,S$GLB,, | Performed by: FAMILY MEDICINE

## 2023-06-22 PROCEDURE — 3074F PR MOST RECENT SYSTOLIC BLOOD PRESSURE < 130 MM HG: ICD-10-PCS | Mod: CPTII,S$GLB,, | Performed by: FAMILY MEDICINE

## 2023-06-22 PROCEDURE — 99214 OFFICE O/P EST MOD 30 MIN: CPT | Mod: S$GLB,,, | Performed by: FAMILY MEDICINE

## 2023-06-22 PROCEDURE — 77067 SCR MAMMO BI INCL CAD: CPT | Mod: 26,,, | Performed by: RADIOLOGY

## 2023-06-22 PROCEDURE — 1159F MED LIST DOCD IN RCRD: CPT | Mod: CPTII,S$GLB,, | Performed by: FAMILY MEDICINE

## 2023-06-22 PROCEDURE — 99999 PR PBB SHADOW E&M-EST. PATIENT-LVL III: ICD-10-PCS | Mod: PBBFAC,,, | Performed by: FAMILY MEDICINE

## 2023-06-22 PROCEDURE — 99999 PR PBB SHADOW E&M-EST. PATIENT-LVL III: CPT | Mod: PBBFAC,,, | Performed by: FAMILY MEDICINE

## 2023-06-22 PROCEDURE — 3078F PR MOST RECENT DIASTOLIC BLOOD PRESSURE < 80 MM HG: ICD-10-PCS | Mod: CPTII,S$GLB,, | Performed by: FAMILY MEDICINE

## 2023-06-22 PROCEDURE — 3008F PR BODY MASS INDEX (BMI) DOCUMENTED: ICD-10-PCS | Mod: CPTII,S$GLB,, | Performed by: FAMILY MEDICINE

## 2023-06-22 PROCEDURE — 1160F PR REVIEW ALL MEDS BY PRESCRIBER/CLIN PHARMACIST DOCUMENTED: ICD-10-PCS | Mod: CPTII,S$GLB,, | Performed by: FAMILY MEDICINE

## 2023-06-22 PROCEDURE — 3078F DIAST BP <80 MM HG: CPT | Mod: CPTII,S$GLB,, | Performed by: FAMILY MEDICINE

## 2023-06-22 NOTE — PROGRESS NOTES
Assessment:     1. Loose stools    2. Gastroesophageal reflux disease, unspecified whether esophagitis present          Plan:     Orders Placed This Encounter    Clostridium difficile EIA    Stool culture    Giardia / Cryptosporidum, EIA    CBC Auto Differential    Comprehensive Metabolic Panel    Lipid Panel    TSH     8/1 appt w CHRISTOPHER Ignacio  Labs prior to PHYS w me in Aug  There are no Patient Instructions on file for this visit.    Patient ID: Jess Mena is a 62 y.o. female.    Here today for GERD, acid reflux, burning, hoarse quality voice.. Never w diarrhea, but 4d ago w 5 yellow stools. Nausea. Bloating, burping. Prilosec, Pepcid didn't help. Yesterday 2 stools, this am loose stool. No blood. Concerned bc dtr (whom she spends a lot of time w)  w Giardia, wt loss. She spends lots of time w her. This occurred prior to going to the beach last week. No camping, raw food, well water, no bad food. No recent antibiotics, no visit to hospital or nursing home. No personal hx C diff    Takes oral pill probiotic daily, does drink Kombucha.     Previously she would only have one BM once a week.     Hi, I scheduled an appointment  with the Dr. Lolly Ignacio however the earliest was august 1st. Is it possible to schedule an earlier appointment with you at least to see if I have Gardia?   I am going out of town again in two weeks . I was hoping to get results prior to that.   Thank you     Thank you!   I have some acid reflux and lower gi issues as well as thinking I could have giardia. My daughter had giardia and I am having some of the same symptoms just not as severe.   I wasnt sure if I should just  go ahead and schedule with a gi.   Please advise .     My daughter was diagnosed with giardia and I am concerned I may have it as well. I have been having gas, bloating, burping and a sick to my stomach feeling for about a week. Is there a blood test I can take to determine?   I am going out of town  Saturday.    Current Outpatient Medications   Medication Instructions    atorvastatin (LIPITOR) 10 MG tablet TAKE 1 TABLET DAILY    buPROPion (WELLBUTRIN XL) 300 mg, Oral, Daily    estradiol 0.05 mg/24 hr td ptsw (VIVELLE-DOT) 0.05 mg/24 hr APPLY 1 PATCH TO SKIN TWICE WEEKLY    hydroCHLOROthiazide (HYDRODIURIL) 25 MG tablet TAKE 1 TABLET DAILY AS NEEDED FOR SWELLING    progesterone (PROMETRIUM) 100 MG capsule TAKE 1 CAPSULE DAILY    valACYclovir (VALTREX) 500 mg, Oral, Daily       Lab Results   Component Value Date    HGBA1C 5.3 06/30/2022     No results found for: MICALBCREAT  Lab Results   Component Value Date    LDLCALC 128.2 06/30/2022    LDLCALC 129.2 07/24/2020    CHOL 221 (H) 06/30/2022    HDL 82 (H) 06/30/2022    TRIG 54 06/30/2022       Lab Results   Component Value Date     06/30/2022    K 4.3 06/30/2022     06/30/2022    CO2 25 06/30/2022    GLU 83 06/30/2022    BUN 16 06/30/2022    CREATININE 0.9 06/30/2022    CALCIUM 9.4 06/30/2022    PROT 6.8 06/30/2022    ALBUMIN 4.0 06/30/2022    BILITOT 0.3 06/30/2022    ALKPHOS 57 06/30/2022    AST 20 06/30/2022    ALT 19 06/30/2022    ANIONGAP 8 06/30/2022    ESTGFRAFRICA >60.0 06/30/2022    EGFRNONAA >60.0 06/30/2022    WBC 4.55 06/30/2022    HGB 12.8 06/30/2022    HGB 12.7 07/24/2020    HCT 40.6 06/30/2022     (H) 06/30/2022     06/30/2022    TSH 0.669 05/28/2009       No results found for: LH, FSH, TOTALTESTOST, PROGESTERONE, ESTRADIOL, [id number], JLBWFLRZ75, FERRITIN, IRON, TRANSFERRIN, TIBC, FESATURATED, ZINC      Past Medical History:   Diagnosis Date    Anxiety     wellbutrin 300    Constipation     Mixed hyperlipidemia 6/27/2013     Past Surgical History:   Procedure Laterality Date    AUGMENTATION OF BREAST      BREAST AUGMENTATION Bilateral 2012    ENDOMETRIAL ABLATION      NECK MASS EXCISION  2013    Fatty Tumor in neck     Social History     Social History Narrative    Not on file     Family History   Problem Relation Age  "of Onset    Heart disease Father 60        cad    Hypertension Father     Stroke Father     Hyperlipidemia Father     Hyperlipidemia Mother     Heart disease Mother         asd    Hypertension Mother     No Known Problems Sister     Hyperlipidemia Brother     Hypertension Brother     No Known Problems Daughter     No Known Problems Daughter     Breast cancer Neg Hx     Cancer Neg Hx     Colon cancer Neg Hx     Ovarian cancer Neg Hx     Melanoma Neg Hx      Vitals:    06/22/23 0947   BP: 102/60   Pulse: 83   Temp: 98.5 °F (36.9 °C)   TempSrc: Oral   SpO2: 98%   Weight: 70 kg (154 lb 5.2 oz)   Height: 5' 10" (1.778 m)   PainSc: 0-No pain     Objective:   Physical Exam                                "

## 2023-06-23 ENCOUNTER — LAB VISIT (OUTPATIENT)
Dept: LAB | Facility: HOSPITAL | Age: 63
End: 2023-06-23
Attending: FAMILY MEDICINE
Payer: COMMERCIAL

## 2023-06-23 DIAGNOSIS — R19.5 LOOSE STOOLS: ICD-10-CM

## 2023-06-23 PROCEDURE — 87449 NOS EACH ORGANISM AG IA: CPT | Mod: 91 | Performed by: FAMILY MEDICINE

## 2023-06-23 PROCEDURE — 87329 GIARDIA AG IA: CPT | Performed by: FAMILY MEDICINE

## 2023-06-23 PROCEDURE — 87427 SHIGA-LIKE TOXIN AG IA: CPT | Performed by: FAMILY MEDICINE

## 2023-06-23 PROCEDURE — 87045 FECES CULTURE AEROBIC BACT: CPT | Performed by: FAMILY MEDICINE

## 2023-06-23 PROCEDURE — 87449 NOS EACH ORGANISM AG IA: CPT | Performed by: FAMILY MEDICINE

## 2023-06-23 PROCEDURE — 87046 STOOL CULTR AEROBIC BACT EA: CPT | Performed by: FAMILY MEDICINE

## 2023-06-24 LAB
C DIFF GDH STL QL: NEGATIVE
C DIFF TOX A+B STL QL IA: NEGATIVE
CRYPTOSP AG STL QL IA: NEGATIVE
E COLI SXT1 STL QL IA: NEGATIVE
E COLI SXT2 STL QL IA: NEGATIVE
G LAMBLIA AG STL QL IA: POSITIVE

## 2023-06-25 ENCOUNTER — PATIENT MESSAGE (OUTPATIENT)
Dept: PRIMARY CARE CLINIC | Facility: CLINIC | Age: 63
End: 2023-06-25
Payer: COMMERCIAL

## 2023-06-26 LAB — BACTERIA STL CULT: NORMAL

## 2023-06-26 RX ORDER — PROMETHAZINE HYDROCHLORIDE 12.5 MG/1
12.5 TABLET ORAL ONCE
Qty: 1 TABLET | Refills: 0 | Status: SHIPPED | OUTPATIENT
Start: 2023-06-26 | End: 2023-06-26

## 2023-06-26 RX ORDER — TINIDAZOLE 500 MG/1
2 TABLET ORAL ONCE
Qty: 4 TABLET | Refills: 0 | Status: SHIPPED | OUTPATIENT
Start: 2023-06-26 | End: 2023-06-26

## 2023-08-01 ENCOUNTER — OFFICE VISIT (OUTPATIENT)
Dept: GASTROENTEROLOGY | Facility: CLINIC | Age: 63
End: 2023-08-01
Payer: COMMERCIAL

## 2023-08-01 ENCOUNTER — LAB VISIT (OUTPATIENT)
Dept: LAB | Facility: HOSPITAL | Age: 63
End: 2023-08-01
Attending: STUDENT IN AN ORGANIZED HEALTH CARE EDUCATION/TRAINING PROGRAM
Payer: COMMERCIAL

## 2023-08-01 VITALS
BODY MASS INDEX: 21.78 KG/M2 | HEIGHT: 70 IN | HEART RATE: 90 BPM | SYSTOLIC BLOOD PRESSURE: 114 MMHG | DIASTOLIC BLOOD PRESSURE: 74 MMHG | WEIGHT: 152.13 LBS

## 2023-08-01 DIAGNOSIS — K58.1 IRRITABLE BOWEL SYNDROME WITH CONSTIPATION: Primary | ICD-10-CM

## 2023-08-01 DIAGNOSIS — K21.9 GASTROESOPHAGEAL REFLUX DISEASE, UNSPECIFIED WHETHER ESOPHAGITIS PRESENT: ICD-10-CM

## 2023-08-01 DIAGNOSIS — K58.1 IRRITABLE BOWEL SYNDROME WITH CONSTIPATION: ICD-10-CM

## 2023-08-01 LAB
BASOPHILS # BLD AUTO: 0.07 K/UL (ref 0–0.2)
BASOPHILS NFR BLD: 0.8 % (ref 0–1.9)
DIFFERENTIAL METHOD: ABNORMAL
EOSINOPHIL # BLD AUTO: 0.1 K/UL (ref 0–0.5)
EOSINOPHIL NFR BLD: 1.2 % (ref 0–8)
ERYTHROCYTE [DISTWIDTH] IN BLOOD BY AUTOMATED COUNT: 13.2 % (ref 11.5–14.5)
HCT VFR BLD AUTO: 40.4 % (ref 37–48.5)
HGB BLD-MCNC: 13 G/DL (ref 12–16)
IMM GRANULOCYTES # BLD AUTO: 0.03 K/UL (ref 0–0.04)
IMM GRANULOCYTES NFR BLD AUTO: 0.3 % (ref 0–0.5)
LYMPHOCYTES # BLD AUTO: 2.1 K/UL (ref 1–4.8)
LYMPHOCYTES NFR BLD: 23.1 % (ref 18–48)
MCH RBC QN AUTO: 31.9 PG (ref 27–31)
MCHC RBC AUTO-ENTMCNC: 32.2 G/DL (ref 32–36)
MCV RBC AUTO: 99 FL (ref 82–98)
MONOCYTES # BLD AUTO: 0.5 K/UL (ref 0.3–1)
MONOCYTES NFR BLD: 5.6 % (ref 4–15)
NEUTROPHILS # BLD AUTO: 6.2 K/UL (ref 1.8–7.7)
NEUTROPHILS NFR BLD: 69 % (ref 38–73)
NRBC BLD-RTO: 0 /100 WBC
PLATELET # BLD AUTO: 350 K/UL (ref 150–450)
PMV BLD AUTO: 10.6 FL (ref 9.2–12.9)
RBC # BLD AUTO: 4.08 M/UL (ref 4–5.4)
VIT B12 SERPL-MCNC: 1189 PG/ML (ref 210–950)
WBC # BLD AUTO: 9.04 K/UL (ref 3.9–12.7)

## 2023-08-01 PROCEDURE — 99999 PR PBB SHADOW E&M-EST. PATIENT-LVL IV: CPT | Mod: PBBFAC,,, | Performed by: STUDENT IN AN ORGANIZED HEALTH CARE EDUCATION/TRAINING PROGRAM

## 2023-08-01 PROCEDURE — 3074F PR MOST RECENT SYSTOLIC BLOOD PRESSURE < 130 MM HG: ICD-10-PCS | Mod: CPTII,S$GLB,, | Performed by: STUDENT IN AN ORGANIZED HEALTH CARE EDUCATION/TRAINING PROGRAM

## 2023-08-01 PROCEDURE — 99204 PR OFFICE/OUTPT VISIT, NEW, LEVL IV, 45-59 MIN: ICD-10-PCS | Mod: S$GLB,,, | Performed by: STUDENT IN AN ORGANIZED HEALTH CARE EDUCATION/TRAINING PROGRAM

## 2023-08-01 PROCEDURE — 1159F PR MEDICATION LIST DOCUMENTED IN MEDICAL RECORD: ICD-10-PCS | Mod: CPTII,S$GLB,, | Performed by: STUDENT IN AN ORGANIZED HEALTH CARE EDUCATION/TRAINING PROGRAM

## 2023-08-01 PROCEDURE — 3078F PR MOST RECENT DIASTOLIC BLOOD PRESSURE < 80 MM HG: ICD-10-PCS | Mod: CPTII,S$GLB,, | Performed by: STUDENT IN AN ORGANIZED HEALTH CARE EDUCATION/TRAINING PROGRAM

## 2023-08-01 PROCEDURE — 82607 VITAMIN B-12: CPT | Performed by: STUDENT IN AN ORGANIZED HEALTH CARE EDUCATION/TRAINING PROGRAM

## 2023-08-01 PROCEDURE — 36415 COLL VENOUS BLD VENIPUNCTURE: CPT | Performed by: STUDENT IN AN ORGANIZED HEALTH CARE EDUCATION/TRAINING PROGRAM

## 2023-08-01 PROCEDURE — 3074F SYST BP LT 130 MM HG: CPT | Mod: CPTII,S$GLB,, | Performed by: STUDENT IN AN ORGANIZED HEALTH CARE EDUCATION/TRAINING PROGRAM

## 2023-08-01 PROCEDURE — 85025 COMPLETE CBC W/AUTO DIFF WBC: CPT | Performed by: STUDENT IN AN ORGANIZED HEALTH CARE EDUCATION/TRAINING PROGRAM

## 2023-08-01 PROCEDURE — 99999 PR PBB SHADOW E&M-EST. PATIENT-LVL IV: ICD-10-PCS | Mod: PBBFAC,,, | Performed by: STUDENT IN AN ORGANIZED HEALTH CARE EDUCATION/TRAINING PROGRAM

## 2023-08-01 PROCEDURE — 3008F PR BODY MASS INDEX (BMI) DOCUMENTED: ICD-10-PCS | Mod: CPTII,S$GLB,, | Performed by: STUDENT IN AN ORGANIZED HEALTH CARE EDUCATION/TRAINING PROGRAM

## 2023-08-01 PROCEDURE — 3008F BODY MASS INDEX DOCD: CPT | Mod: CPTII,S$GLB,, | Performed by: STUDENT IN AN ORGANIZED HEALTH CARE EDUCATION/TRAINING PROGRAM

## 2023-08-01 PROCEDURE — 3078F DIAST BP <80 MM HG: CPT | Mod: CPTII,S$GLB,, | Performed by: STUDENT IN AN ORGANIZED HEALTH CARE EDUCATION/TRAINING PROGRAM

## 2023-08-01 PROCEDURE — 99204 OFFICE O/P NEW MOD 45 MIN: CPT | Mod: S$GLB,,, | Performed by: STUDENT IN AN ORGANIZED HEALTH CARE EDUCATION/TRAINING PROGRAM

## 2023-08-01 PROCEDURE — 1159F MED LIST DOCD IN RCRD: CPT | Mod: CPTII,S$GLB,, | Performed by: STUDENT IN AN ORGANIZED HEALTH CARE EDUCATION/TRAINING PROGRAM

## 2023-08-01 NOTE — PROGRESS NOTES
"    [facility] Consultation Note    Reason for Consult:  The primary encounter diagnosis was Irritable bowel syndrome with constipation. A diagnosis of Gastroesophageal reflux disease, unspecified whether esophagitis present was also pertinent to this visit.    PCP:   Angella Meade   [address] / [place] [address]    Referring MD:  Angella Meade Md  [address] [address]    HPI:  This is a 62 y.o. female here for evaluation of chronic constipation. PMHx of Anxiety, HLD. She has been chronically constipated for years with one BM per week. Takes laxatives when she needs to have a BM. Typically will take 3 milk of magnesia. Sometimes suppositories. She also has times where she had BM and then has to come back soon for another BM as she does not completely empty. She uses a stool. No blood in stool or weight loss. Interestingly, she had giardia infection in 6/2023 and at that time she was having more frequent stools, bloating and nausea. This resolved with treatment. She occasionally has some reflux symptoms. No dysphagia, hematemesis, melena. Last blood work in 2022 showed macrocytosis but otherwise CBC wnl. No recent abdominal imaging. She had two children. Cscope in 2013 and it was redundant she thinks. No polyps. Cologuard 2022 negative. Denied NSAID use. No Fh of CRC, gastric cancer, celiac or IBD. She has tried and failed fiber, miralax, stimulant laxatives.     Objective Findings:    Vital Signs:  /74 (BP Location: Left arm, Patient Position: Sitting, BP Method: Small (Automatic))   Pulse 90   Ht 5' 10" (1.778 m)   Wt 69 kg (152 lb 1.9 oz)   LMP 01/15/2014   BMI 21.83 kg/m²   Body mass index is 21.83 kg/m².    Physical Exam:  General Appearance: Well appearing in no acute distress  Head:   Normocephalic, without obvious abnormality  Eyes:    No scleral icterus    Lungs: CTA bilaterally in anterior and posterior fields   Heart:  Regular rate and " rhythm, no murmurs heard  Abdomen: Soft, non tender, non distended with positive bowel sounds in all four quadrants.      Imaging:  Reviewed     Endoscopy:    Reviewed     Assessment:    Chronic Constipation     Patient with chronic constipation with some bloating which has failed OTC medications. Trial of linzess. Continue squatty potty use. Also can consider bowel clean out prior to starting linzess. Also check B12 and CBC due to macrocytosis.        Recommendations:    - Start linzess daily   - CBC and B12 due to macrocytosis   - Consider GI nutrition referral next visit   - Also can consider ARM if she does not respond to rx   - continue cologuard for CRC screening if she prefers        RTC 3 months       Order summary:  Orders Placed This Encounter    CBC Auto Differential    VITAMIN B12    linaCLOtide (LINZESS) 72 mcg Cap capsule         Thank you so much for allowing me to participate in the care of Jess Ignacio MD  Gastroenterology   [facility]

## 2023-08-01 NOTE — PATIENT INSTRUCTIONS
Can consider miralax/gatorade clean out before starting linzess     Two 32ox gatorades and bottle of miralax- mix and drink slowly over course of 1-2 days

## 2023-08-17 ENCOUNTER — PATIENT OUTREACH (OUTPATIENT)
Dept: ADMINISTRATIVE | Facility: HOSPITAL | Age: 63
End: 2023-08-17
Payer: COMMERCIAL

## 2023-08-17 NOTE — PROGRESS NOTES
Health Maintenance Due   Topic Date Due    COVID-19 Vaccine (1) Never done    Cervical Cancer Screening  01/09/2022        Chart review done.   HM updated.   Immunizations reviewed & updated.   Care Everywhere updated.   [facility] reviewed

## 2023-08-24 ENCOUNTER — LAB VISIT (OUTPATIENT)
Dept: LAB | Facility: HOSPITAL | Age: 63
End: 2023-08-24
Attending: FAMILY MEDICINE
Payer: COMMERCIAL

## 2023-08-24 DIAGNOSIS — Z00.00 ROUTINE GENERAL MEDICAL EXAMINATION AT A HEALTH CARE FACILITY: ICD-10-CM

## 2023-08-24 LAB
ALBUMIN SERPL BCP-MCNC: 4 G/DL (ref 3.5–5.2)
ALP SERPL-CCNC: 57 U/L (ref 55–135)
ALT SERPL W/O P-5'-P-CCNC: 18 U/L (ref 10–44)
ANION GAP SERPL CALC-SCNC: 8 MMOL/L (ref 8–16)
AST SERPL-CCNC: 19 U/L (ref 10–40)
BASOPHILS # BLD AUTO: 0.06 K/UL (ref 0–0.2)
BASOPHILS NFR BLD: 1 % (ref 0–1.9)
BILIRUB SERPL-MCNC: 0.5 MG/DL (ref 0.1–1)
BUN SERPL-MCNC: 16 MG/DL (ref 8–23)
CALCIUM SERPL-MCNC: 9.6 MG/DL (ref 8.7–10.5)
CHLORIDE SERPL-SCNC: 109 MMOL/L (ref 95–110)
CHOLEST SERPL-MCNC: 204 MG/DL (ref 120–199)
CHOLEST/HDLC SERPL: 2.8 {RATIO} (ref 2–5)
CO2 SERPL-SCNC: 26 MMOL/L (ref 23–29)
CREAT SERPL-MCNC: 1 MG/DL (ref 0.5–1.4)
DIFFERENTIAL METHOD: ABNORMAL
EOSINOPHIL # BLD AUTO: 0.1 K/UL (ref 0–0.5)
EOSINOPHIL NFR BLD: 1.9 % (ref 0–8)
ERYTHROCYTE [DISTWIDTH] IN BLOOD BY AUTOMATED COUNT: 12.8 % (ref 11.5–14.5)
EST. GFR  (NO RACE VARIABLE): >60 ML/MIN/1.73 M^2
GLUCOSE SERPL-MCNC: 86 MG/DL (ref 70–110)
HCT VFR BLD AUTO: 38.6 % (ref 37–48.5)
HDLC SERPL-MCNC: 73 MG/DL (ref 40–75)
HDLC SERPL: 35.8 % (ref 20–50)
HGB BLD-MCNC: 12.4 G/DL (ref 12–16)
IMM GRANULOCYTES # BLD AUTO: 0.01 K/UL (ref 0–0.04)
IMM GRANULOCYTES NFR BLD AUTO: 0.2 % (ref 0–0.5)
LDLC SERPL CALC-MCNC: 117.6 MG/DL (ref 63–159)
LYMPHOCYTES # BLD AUTO: 1.9 K/UL (ref 1–4.8)
LYMPHOCYTES NFR BLD: 31.9 % (ref 18–48)
MCH RBC QN AUTO: 31.5 PG (ref 27–31)
MCHC RBC AUTO-ENTMCNC: 32.1 G/DL (ref 32–36)
MCV RBC AUTO: 98 FL (ref 82–98)
MONOCYTES # BLD AUTO: 0.4 K/UL (ref 0.3–1)
MONOCYTES NFR BLD: 7.2 % (ref 4–15)
NEUTROPHILS # BLD AUTO: 3.4 K/UL (ref 1.8–7.7)
NEUTROPHILS NFR BLD: 57.8 % (ref 38–73)
NONHDLC SERPL-MCNC: 131 MG/DL
NRBC BLD-RTO: 0 /100 WBC
PLATELET # BLD AUTO: 307 K/UL (ref 150–450)
PMV BLD AUTO: 10.6 FL (ref 9.2–12.9)
POTASSIUM SERPL-SCNC: 4.1 MMOL/L (ref 3.5–5.1)
PROT SERPL-MCNC: 6.9 G/DL (ref 6–8.4)
RBC # BLD AUTO: 3.94 M/UL (ref 4–5.4)
SODIUM SERPL-SCNC: 143 MMOL/L (ref 136–145)
TRIGL SERPL-MCNC: 67 MG/DL (ref 30–150)
TSH SERPL DL<=0.005 MIU/L-ACNC: 1.45 UIU/ML (ref 0.4–4)
WBC # BLD AUTO: 5.86 K/UL (ref 3.9–12.7)

## 2023-08-24 PROCEDURE — 80061 LIPID PANEL: CPT | Performed by: FAMILY MEDICINE

## 2023-08-24 PROCEDURE — 80053 COMPREHEN METABOLIC PANEL: CPT | Performed by: FAMILY MEDICINE

## 2023-08-24 PROCEDURE — 84443 ASSAY THYROID STIM HORMONE: CPT | Performed by: FAMILY MEDICINE

## 2023-08-24 PROCEDURE — 36415 COLL VENOUS BLD VENIPUNCTURE: CPT | Mod: PO | Performed by: FAMILY MEDICINE

## 2023-08-24 PROCEDURE — 85025 COMPLETE CBC W/AUTO DIFF WBC: CPT | Performed by: FAMILY MEDICINE

## 2023-08-31 ENCOUNTER — OFFICE VISIT (OUTPATIENT)
Dept: PRIMARY CARE CLINIC | Facility: CLINIC | Age: 63
End: 2023-08-31
Payer: COMMERCIAL

## 2023-08-31 ENCOUNTER — TELEPHONE (OUTPATIENT)
Dept: OBSTETRICS AND GYNECOLOGY | Facility: CLINIC | Age: 63
End: 2023-08-31
Payer: COMMERCIAL

## 2023-08-31 VITALS
DIASTOLIC BLOOD PRESSURE: 68 MMHG | HEART RATE: 63 BPM | BODY MASS INDEX: 22.32 KG/M2 | SYSTOLIC BLOOD PRESSURE: 100 MMHG | TEMPERATURE: 99 F | WEIGHT: 155.88 LBS | OXYGEN SATURATION: 99 % | HEIGHT: 70 IN

## 2023-08-31 DIAGNOSIS — Z00.00 ROUTINE GENERAL MEDICAL EXAMINATION AT A HEALTH CARE FACILITY: Primary | ICD-10-CM

## 2023-08-31 DIAGNOSIS — F41.9 ANXIETY: ICD-10-CM

## 2023-08-31 DIAGNOSIS — E78.2 MIXED HYPERLIPIDEMIA: ICD-10-CM

## 2023-08-31 DIAGNOSIS — A60.00 GENITAL HERPES SIMPLEX, UNSPECIFIED SITE: ICD-10-CM

## 2023-08-31 DIAGNOSIS — R60.0 FLUID RETENTION IN LEGS: ICD-10-CM

## 2023-08-31 DIAGNOSIS — K59.09 CONSTIPATION, CHRONIC: ICD-10-CM

## 2023-08-31 PROCEDURE — 1160F RVW MEDS BY RX/DR IN RCRD: CPT | Mod: CPTII,S$GLB,, | Performed by: FAMILY MEDICINE

## 2023-08-31 PROCEDURE — 3008F BODY MASS INDEX DOCD: CPT | Mod: CPTII,S$GLB,, | Performed by: FAMILY MEDICINE

## 2023-08-31 PROCEDURE — 99999 PR PBB SHADOW E&M-EST. PATIENT-LVL IV: ICD-10-PCS | Mod: PBBFAC,,, | Performed by: FAMILY MEDICINE

## 2023-08-31 PROCEDURE — 99396 PR PREVENTIVE VISIT,EST,40-64: ICD-10-PCS | Mod: S$GLB,,, | Performed by: FAMILY MEDICINE

## 2023-08-31 PROCEDURE — 3078F PR MOST RECENT DIASTOLIC BLOOD PRESSURE < 80 MM HG: ICD-10-PCS | Mod: CPTII,S$GLB,, | Performed by: FAMILY MEDICINE

## 2023-08-31 PROCEDURE — 3074F SYST BP LT 130 MM HG: CPT | Mod: CPTII,S$GLB,, | Performed by: FAMILY MEDICINE

## 2023-08-31 PROCEDURE — 1160F PR REVIEW ALL MEDS BY PRESCRIBER/CLIN PHARMACIST DOCUMENTED: ICD-10-PCS | Mod: CPTII,S$GLB,, | Performed by: FAMILY MEDICINE

## 2023-08-31 PROCEDURE — 99999 PR PBB SHADOW E&M-EST. PATIENT-LVL IV: CPT | Mod: PBBFAC,,, | Performed by: FAMILY MEDICINE

## 2023-08-31 PROCEDURE — 99396 PREV VISIT EST AGE 40-64: CPT | Mod: S$GLB,,, | Performed by: FAMILY MEDICINE

## 2023-08-31 PROCEDURE — 3074F PR MOST RECENT SYSTOLIC BLOOD PRESSURE < 130 MM HG: ICD-10-PCS | Mod: CPTII,S$GLB,, | Performed by: FAMILY MEDICINE

## 2023-08-31 PROCEDURE — 3078F DIAST BP <80 MM HG: CPT | Mod: CPTII,S$GLB,, | Performed by: FAMILY MEDICINE

## 2023-08-31 PROCEDURE — 1159F PR MEDICATION LIST DOCUMENTED IN MEDICAL RECORD: ICD-10-PCS | Mod: CPTII,S$GLB,, | Performed by: FAMILY MEDICINE

## 2023-08-31 PROCEDURE — 3008F PR BODY MASS INDEX (BMI) DOCUMENTED: ICD-10-PCS | Mod: CPTII,S$GLB,, | Performed by: FAMILY MEDICINE

## 2023-08-31 PROCEDURE — 1159F MED LIST DOCD IN RCRD: CPT | Mod: CPTII,S$GLB,, | Performed by: FAMILY MEDICINE

## 2023-08-31 RX ORDER — TINIDAZOLE 500 MG/1
1000 TABLET ORAL 2 TIMES DAILY
COMMUNITY
Start: 2023-06-26 | End: 2023-08-31

## 2023-08-31 RX ORDER — PROMETHAZINE HYDROCHLORIDE 12.5 MG/1
12.5 TABLET ORAL ONCE AS NEEDED
COMMUNITY
Start: 2023-06-26 | End: 2024-01-31

## 2023-08-31 NOTE — ASSESSMENT & PLAN NOTE
Follow with GYN for female health & cancer prevention  Move more, low fat, high fiber foods  Eat more food grown from the earth (picked from trees or out of the ground)  Colon cancer screening at 46 yo  Follow up yearly with LABS ONE WEEK PRIOR so we can discuss at your visit

## 2023-08-31 NOTE — ASSESSMENT & PLAN NOTE
Stable, continue Atorvastatin 10  Move more, sit less  High fiber, low fat foods  Try to eat 5 fresh COLORS a day

## 2023-08-31 NOTE — PROGRESS NOTES
"      Assessment:     1. Routine general medical examination at a health care facility    2. Mixed hyperlipidemia    3. Anxiety    4. Constipation, chronic    5. Fluid retention in legs    6. Genital herpes simplex, unspecified site      Plan:     Routine general medical examination at a health care facility  Follow with GYN for female health & cancer prevention  Move more, low fat, high fiber foods  Eat more food grown from the earth (picked from trees or out of the ground)  Colon cancer screening at 44 yo  Follow up yearly with LABS ONE WEEK PRIOR so we can discuss at your visit      Mixed hyperlipidemia  Stable, continue Atorvastatin 10  Move more, sit less  High fiber, low fat foods  Try to eat 5 fresh COLORS a day        Anxiety  Stable, continue Bupropion 300 daily    Constipation, chronic  Stable,continue Linzess, follow w Gi Dr Ignacio    Fluid retention in legs  Rare, takes HCTZ 25 a few times a year, increase water intake & movement    Genital herpes  Stable, valacyclovir for flare up 5x/yr (due to stress)      CHIEF COMPLAINT: General exam    HPI: Jess Mena is a 63 y.o. female with is here today for general exam.     Denies chest pain, shortness of breath    Current Outpatient Medications   Medication Instructions    atorvastatin (LIPITOR) 10 MG tablet TAKE 1 TABLET DAILY    buPROPion (WELLBUTRIN XL) 300 mg, Oral    estradiol 0.05 mg/24 hr td ptsw (VIVELLE-DOT) 0.05 mg/24 hr APPLY 1 PATCH TO SKIN TWICE WEEKLY    hydroCHLOROthiazide (HYDRODIURIL) 25 MG tablet TAKE 1 TABLET DAILY AS NEEDED FOR SWELLING    linaCLOtide (LINZESS) 72 mcg, Oral, Before breakfast    progesterone (PROMETRIUM) 100 MG capsule TAKE 1 CAPSULE DAILY    promethazine (PHENERGAN) 12.5 mg, Oral, Once as needed    valACYclovir (VALTREX) 500 mg, Oral, Daily       Lab Results   Component Value Date    HGBA1C 5.3 06/30/2022     No results found for: "MICALBCREAT"  Lab Results   Component Value Date    LDLCALC 117.6 08/24/2023    LDLCALC " "128.2 06/30/2022    CHOL 204 (H) 08/24/2023    HDL 73 08/24/2023    TRIG 67 08/24/2023       Lab Results   Component Value Date     08/24/2023    K 4.1 08/24/2023     08/24/2023    CO2 26 08/24/2023    GLU 86 08/24/2023    BUN 16 08/24/2023    CREATININE 1.0 08/24/2023    CALCIUM 9.6 08/24/2023    PROT 6.9 08/24/2023    ALBUMIN 4.0 08/24/2023    BILITOT 0.5 08/24/2023    ALKPHOS 57 08/24/2023    AST 19 08/24/2023    ALT 18 08/24/2023    ANIONGAP 8 08/24/2023    ESTGFRAFRICA >60.0 06/30/2022    EGFRNONAA >60.0 06/30/2022    WBC 5.86 08/24/2023    HGB 12.4 08/24/2023    HGB 13.0 08/01/2023    HCT 38.6 08/24/2023    MCV 98 08/24/2023     08/24/2023    TSH 1.448 08/24/2023       Lab Results   Component Value Date    WUABLSCA98 1189 (H) 08/01/2023         Past Medical History:   Diagnosis Date    Anxiety     wellbutrin 300    Constipation     Mixed hyperlipidemia 6/27/2013     Past Surgical History:   Procedure Laterality Date    AUGMENTATION OF BREAST      BREAST AUGMENTATION Bilateral 2012    ENDOMETRIAL ABLATION      NECK MASS EXCISION  2013    Fatty Tumor in neck     Vitals:    08/31/23 1051   BP: 100/68   Pulse: 63   Temp: 99 °F (37.2 °C)   TempSrc: Oral   SpO2: 99%   Weight: 70.7 kg (155 lb 13.8 oz)   Height: 5' 10" (1.778 m)   PainSc: 0-No pain     Objective:   Physical Exam  Constitutional:       Appearance: She is well-developed.   Eyes:      Pupils: Pupils are equal, round, and reactive to light.   Cardiovascular:      Rate and Rhythm: Normal rate and regular rhythm.      Heart sounds: Normal heart sounds. No murmur heard.  Pulmonary:      Effort: Pulmonary effort is normal.      Breath sounds: Normal breath sounds. No wheezing.   Abdominal:      General: Bowel sounds are normal. There is no distension.      Palpations: Abdomen is soft. There is no mass.      Tenderness: There is no abdominal tenderness. There is no guarding or rebound.   Musculoskeletal:      Cervical back: Neck supple. "   Skin:     General: Skin is warm and dry.   Neurological:      Mental Status: She is alert.   Psychiatric:         Behavior: Behavior normal.

## 2023-08-31 NOTE — TELEPHONE ENCOUNTER
----- Message from Virginia Manning sent at 8/31/2023  2:11 PM CDT -----  Regarding: Well Woman Exam  Contact: [phone]  Good afternoon,     Pt called, wanting to schedule an appointment with Dr. Villavicencio Pt said she was her OBGYN, and is requesting a call from staff, in hopes to schedule an appointment soon.    DX: Well Woman Exam    Thank you,   Harriet Awan Navigator

## 2023-09-01 ENCOUNTER — PATIENT MESSAGE (OUTPATIENT)
Dept: GASTROENTEROLOGY | Facility: CLINIC | Age: 63
End: 2023-09-01
Payer: COMMERCIAL

## 2023-09-01 DIAGNOSIS — K58.1 IRRITABLE BOWEL SYNDROME WITH CONSTIPATION: ICD-10-CM

## 2023-11-27 DIAGNOSIS — F41.9 ANXIETY: ICD-10-CM

## 2023-11-27 RX ORDER — BUPROPION HYDROCHLORIDE 300 MG/1
300 TABLET ORAL
Qty: 90 TABLET | Refills: 3 | Status: SHIPPED | OUTPATIENT
Start: 2023-11-27

## 2023-11-27 NOTE — TELEPHONE ENCOUNTER
Refill Routing Note   Medication(s) are not appropriate for processing by [facility] for the following reason(s):        Patient not seen by provider within 15 months    [facility] action(s):  Defer               Appointments  past 12m or future 3m with PCP    Date Provider   Last Visit   1/30/2020 Derick Villavicencio MD   Next Visit   1/31/2024 Derick Villavicencio MD   ED visits in past 90 days: 0        Note composed:11:38 AM 11/27/2023

## 2023-12-04 PROBLEM — Z00.00 ROUTINE GENERAL MEDICAL EXAMINATION AT A HEALTH CARE FACILITY: Status: RESOLVED | Noted: 2023-08-31 | Resolved: 2023-12-04

## 2024-01-09 DIAGNOSIS — N95.1 MENOPAUSAL SYNDROME: ICD-10-CM

## 2024-01-09 RX ORDER — PROGESTERONE 100 MG/1
CAPSULE ORAL
Qty: 30 CAPSULE | Refills: 0 | Status: SHIPPED | OUTPATIENT
Start: 2024-01-09

## 2024-01-09 NOTE — TELEPHONE ENCOUNTER
Refill Routing Note   Medication(s) are not appropriate for processing by [facility] for the following reason(s):        Patient not seen by provider within 15 months( FOVS in 3 weeks)    [facility] action(s):  Defer        Medication Therapy Plan: FOVS in 3 weeks; qty adjusted to 30 days to carry through to appt      Appointments  past 12m or future 3m with PCP    Date Provider   Last Visit   1/30/2020 Derick Villavicencio MD   Next Visit   1/31/2024 Derick Villavicencio MD   ED visits in past 90 days: 0        Note composed:9:49 AM 01/09/2024

## 2024-01-22 DIAGNOSIS — N95.1 MENOPAUSAL SYNDROME: ICD-10-CM

## 2024-01-22 RX ORDER — ESTRADIOL 0.05 MG/D
FILM, EXTENDED RELEASE TRANSDERMAL
Qty: 24 PATCH | Refills: 3 | Status: SHIPPED | OUTPATIENT
Start: 2024-01-22

## 2024-01-31 ENCOUNTER — OFFICE VISIT (OUTPATIENT)
Dept: OBSTETRICS AND GYNECOLOGY | Facility: CLINIC | Age: 64
End: 2024-01-31
Payer: COMMERCIAL

## 2024-01-31 VITALS — BODY MASS INDEX: 22.09 KG/M2 | HEIGHT: 70 IN | WEIGHT: 154.31 LBS

## 2024-01-31 DIAGNOSIS — T75.3XXA SEA SICKNESS, INITIAL ENCOUNTER: ICD-10-CM

## 2024-01-31 DIAGNOSIS — Z12.31 BREAST CANCER SCREENING BY MAMMOGRAM: ICD-10-CM

## 2024-01-31 DIAGNOSIS — N95.1 MENOPAUSAL HOT FLUSHES: ICD-10-CM

## 2024-01-31 DIAGNOSIS — Z12.4 ENCOUNTER FOR PAPANICOLAOU SMEAR FOR CERVICAL CANCER SCREENING: ICD-10-CM

## 2024-01-31 DIAGNOSIS — Z01.419 ENCOUNTER FOR GYNECOLOGICAL EXAMINATION: Primary | ICD-10-CM

## 2024-01-31 DIAGNOSIS — Z11.51 ENCOUNTER FOR SCREENING FOR HUMAN PAPILLOMAVIRUS (HPV): ICD-10-CM

## 2024-01-31 PROCEDURE — 1159F MED LIST DOCD IN RCRD: CPT | Mod: CPTII,S$GLB,, | Performed by: OBSTETRICS & GYNECOLOGY

## 2024-01-31 PROCEDURE — 99396 PREV VISIT EST AGE 40-64: CPT | Mod: S$GLB,,, | Performed by: OBSTETRICS & GYNECOLOGY

## 2024-01-31 PROCEDURE — 87624 HPV HI-RISK TYP POOLED RSLT: CPT | Performed by: OBSTETRICS & GYNECOLOGY

## 2024-01-31 PROCEDURE — 99999 PR PBB SHADOW E&M-EST. PATIENT-LVL III: CPT | Mod: PBBFAC,,, | Performed by: OBSTETRICS & GYNECOLOGY

## 2024-01-31 PROCEDURE — 88175 CYTOPATH C/V AUTO FLUID REDO: CPT | Performed by: PATHOLOGY

## 2024-01-31 PROCEDURE — 3008F BODY MASS INDEX DOCD: CPT | Mod: CPTII,S$GLB,, | Performed by: OBSTETRICS & GYNECOLOGY

## 2024-01-31 PROCEDURE — 88141 CYTOPATH C/V INTERPRET: CPT | Mod: ,,, | Performed by: PATHOLOGY

## 2024-01-31 RX ORDER — ONDANSETRON 8 MG/1
8 TABLET, ORALLY DISINTEGRATING ORAL 2 TIMES DAILY
Qty: 20 TABLET | Refills: 0 | Status: SHIPPED | OUTPATIENT
Start: 2024-01-31 | End: 2024-01-31 | Stop reason: SDUPTHER

## 2024-01-31 RX ORDER — SCOLOPAMINE TRANSDERMAL SYSTEM 1 MG/1
1 PATCH, EXTENDED RELEASE TRANSDERMAL
Qty: 3 PATCH | Refills: 1 | Status: SHIPPED | OUTPATIENT
Start: 2024-01-31 | End: 2024-01-31 | Stop reason: SDUPTHER

## 2024-01-31 RX ORDER — ESTRADIOL AND PROGESTERONE 1; 100 MG/1; MG/1
1 CAPSULE ORAL DAILY
Qty: 90 CAPSULE | Refills: 3 | Status: SHIPPED | OUTPATIENT
Start: 2024-01-31

## 2024-01-31 RX ORDER — SCOLOPAMINE TRANSDERMAL SYSTEM 1 MG/1
1 PATCH, EXTENDED RELEASE TRANSDERMAL
Qty: 3 PATCH | Refills: 1 | Status: SHIPPED | OUTPATIENT
Start: 2024-01-31

## 2024-01-31 RX ORDER — ONDANSETRON 8 MG/1
8 TABLET, ORALLY DISINTEGRATING ORAL 2 TIMES DAILY
Qty: 20 TABLET | Refills: 0 | Status: SHIPPED | OUTPATIENT
Start: 2024-01-31

## 2024-01-31 NOTE — PROGRESS NOTES
Chief Complaint Well woman exam:  New pt./Old file    She is established    Jess Mena is a 63 y.o. female  presents for a well woman exam.    No c/o Doing well on HRT but would like to try less expensive options  Going on cruise and gets sea sick    Review of Systems - :   No abdominal pain, No vaginal bleeding or discharge,   No breast pain or masses, No rectal bleeding     LMP: Patient's last menstrual period was 01/15/2014..    Meds per MD: Progesterone & Vivelle Dot     Last Pap:  pap & hpv negative  Last MM birads 1 [facility]  Last Colonoscopy:  Cologuard negative      Past Medical History:   Diagnosis Date    Anxiety     wellbutrin 300    Constipation     Mixed hyperlipidemia 2013       Past Surgical History:   Procedure Laterality Date    AUGMENTATION OF BREAST      BREAST AUGMENTATION Bilateral     ENDOMETRIAL ABLATION      NECK MASS EXCISION      Fatty Tumor in neck       OB History    Para Term  AB Living   2 2 2     2   SAB IAB Ectopic Multiple Live Births           2      # Outcome Date GA Lbr Lawrence/2nd Weight Sex Delivery Anes PTL Lv   2 Term  40w0d  3.374 kg (7 lb 7 oz) F Vag-Spont EPI  ALFONSO   1 Term  40w0d  3.629 kg (8 lb) F Vag-Spont EPI  ALFONSO      Obstetric Comments   Menarche age 12       Family History   Problem Relation Age of Onset    Heart disease Father 60        cad    Hypertension Father     Stroke Father     Hyperlipidemia Father     Hyperlipidemia Mother     Heart disease Mother         asd    Hypertension Mother     Hyperlipidemia Brother     Hypertension Brother     No Known Problems Sister     No Known Problems Daughter     No Known Problems Daughter     Breast cancer Neg Hx     Cancer Neg Hx     Colon cancer Neg Hx     Ovarian cancer Neg Hx     Melanoma Neg Hx        Social History     Tobacco Use    Smoking status: Never    Smokeless tobacco: Never   Substance Use Topics    Alcohol use: Yes     Comment: Occationally - once every  "few months    Drug use: No         Physical Exam:  Ht 5' 10" (1.778 m)   Wt 70 kg (154 lb 5.2 oz)   LMP 01/15/2014   BMI 22.14 kg/m²     APPEARANCE: Well nourished, well developed, in no acute distress.  AFFECT: WNL, alert and oriented x 3  SKIN: No acne or hirsutism  BREASTS: Symmetrical, no skin changes.                     No nipple discharge.   No palpable masses bilaterally  NODES: No inguinal nor axillary LAD  ABDOMEN: soft Non tender Non distended No masses  PELVIC:   Normal external genitalia without lesions.   Normal urethral meatus.    No signif cystocele or rectocele.  Vagina atrophic without lesions or discharge.    Cervix atrophic, without lesions, discharge or tenderness.    Bimanual exam shows uterus to be normal size, regular, mobile and nontender.    Adnexa without masses or tenderness.    EXTREMITIES: No edema.    ASSESSMENT AND PLAN    Jess was seen today for new pt./old file.    Diagnoses and all orders for this visit:    Encounter for gynecological examination   PAP done - [facility] in [place]    Will change to Bijuva       Encounter for screening for human papillomavirus (HPV)  -     HPV High Risk Genotypes, PCR    Breast cancer screening by mammogram  -     Mammo Digital Screening Bilat w/ Morris; Future    Encounter for Papanicolaou smear for cervical cancer screening  -     Liquid-Based Pap Smear, Screening    Menopausal hot flushes  -     estradiol-progesterone (BIJUVA) 1-100 mg Cap; Take 1 tablet by mouth once daily.            Patient was counseled today on A.C.S. Pap guidelines and recommendations for yearly pelvic exams, mammograms and monthly self breast exams; to see her PCP for other health maintenance.     Patient encouraged to register for portal and results will be sent via portal.    No follow-ups on file.         Health Maintenance   Topic Date Due    Mammogram  06/22/2025    Colorectal Cancer Screening  08/29/2025    Lipid Panel  08/24/2028    TETANUS VACCINE  10/18/2028    " Hepatitis C Screening  Completed    Shingles Vaccine  Completed

## 2024-01-31 NOTE — PROGRESS NOTES
LMP: Patient's last menstrual period was 01/15/2014..    Meds per MD: Progesterone & Brad Crenshaw    Last Pap: 2017 pap & hpv negative  Last MM birads 1 [facility]  Last Colonoscopy:  Cologuard negative

## 2024-02-07 LAB
FINAL PATHOLOGIC DIAGNOSIS: NORMAL
Lab: NORMAL

## 2024-02-08 LAB
HPV HR 12 DNA SPEC QL NAA+PROBE: NEGATIVE
HPV16 AG SPEC QL: NEGATIVE
HPV18 DNA SPEC QL NAA+PROBE: NEGATIVE

## 2024-04-16 ENCOUNTER — PATIENT MESSAGE (OUTPATIENT)
Dept: GASTROENTEROLOGY | Facility: CLINIC | Age: 64
End: 2024-04-16
Payer: COMMERCIAL

## 2024-04-16 DIAGNOSIS — K58.1 IRRITABLE BOWEL SYNDROME WITH CONSTIPATION: Primary | ICD-10-CM

## 2024-04-16 RX ORDER — LUBIPROSTONE 8 UG/1
8 CAPSULE ORAL 2 TIMES DAILY
Qty: 60 CAPSULE | Refills: 11 | Status: SHIPPED | OUTPATIENT
Start: 2024-04-16 | End: 2025-04-11

## 2024-04-18 ENCOUNTER — PATIENT MESSAGE (OUTPATIENT)
Dept: GASTROENTEROLOGY | Facility: CLINIC | Age: 64
End: 2024-04-18
Payer: COMMERCIAL

## 2024-05-20 NOTE — TELEPHONE ENCOUNTER
No care due was identified.  Health [facility] Embedded Care Due Messages. Reference number: [id number].   5/20/2024 11:36:06 AM CDT

## 2024-05-21 RX ORDER — VALACYCLOVIR HYDROCHLORIDE 500 MG/1
500 TABLET, FILM COATED ORAL
Qty: 90 TABLET | Refills: 1 | Status: SHIPPED | OUTPATIENT
Start: 2024-05-21

## 2024-05-21 NOTE — TELEPHONE ENCOUNTER
Refill Decision Note   Jess Mena  is requesting a refill authorization.  Brief Assessment and Rationale for Refill:  Approve     Medication Therapy Plan:         Comments:     Note composed:10:09 AM 05/21/2024             Appointments     Last Visit   8/31/2023 Angella Meade MD   Next Visit   Visit date not found Angella Meade MD

## 2024-05-26 ENCOUNTER — PATIENT MESSAGE (OUTPATIENT)
Dept: OBSTETRICS AND GYNECOLOGY | Facility: CLINIC | Age: 64
End: 2024-05-26
Payer: COMMERCIAL

## 2024-05-26 ENCOUNTER — PATIENT MESSAGE (OUTPATIENT)
Dept: GASTROENTEROLOGY | Facility: CLINIC | Age: 64
End: 2024-05-26
Payer: COMMERCIAL

## 2024-05-30 ENCOUNTER — CLINICAL SUPPORT (OUTPATIENT)
Dept: OTHER | Facility: CLINIC | Age: 64
End: 2024-05-30
Payer: COMMERCIAL

## 2024-05-30 DIAGNOSIS — Z00.8 ENCOUNTER FOR OTHER GENERAL EXAMINATION: ICD-10-CM

## 2024-05-30 PROCEDURE — 99401 PREV MED CNSL INDIV APPRX 15: CPT | Mod: S$GLB,,, | Performed by: INTERNAL MEDICINE

## 2024-05-30 PROCEDURE — 82947 ASSAY GLUCOSE BLOOD QUANT: CPT | Mod: QW,S$GLB,, | Performed by: INTERNAL MEDICINE

## 2024-05-30 PROCEDURE — 80061 LIPID PANEL: CPT | Mod: QW,S$GLB,, | Performed by: INTERNAL MEDICINE

## 2024-05-31 VITALS
SYSTOLIC BLOOD PRESSURE: 108 MMHG | WEIGHT: 154 LBS | DIASTOLIC BLOOD PRESSURE: 69 MMHG | HEIGHT: 70 IN | BODY MASS INDEX: 22.05 KG/M2

## 2024-05-31 LAB
GLUCOSE SERPL-MCNC: 86 MG/DL (ref 60–140)
HDLC SERPL-MCNC: 86 MG/DL
POC CHOLESTEROL, LDL (DOCK): 104 MG/DL
POC CHOLESTEROL, TOTAL: 212 MG/DL
TRIGL SERPL-MCNC: 132 MG/DL

## 2024-06-19 ENCOUNTER — PATIENT MESSAGE (OUTPATIENT)
Dept: GASTROENTEROLOGY | Facility: CLINIC | Age: 64
End: 2024-06-19
Payer: COMMERCIAL

## 2024-06-20 DIAGNOSIS — K58.1 IRRITABLE BOWEL SYNDROME WITH CONSTIPATION: Primary | ICD-10-CM

## 2024-06-20 RX ORDER — LUBIPROSTONE 24 UG/1
24 CAPSULE ORAL 2 TIMES DAILY
Qty: 60 CAPSULE | Refills: 11 | Status: SHIPPED | OUTPATIENT
Start: 2024-06-20 | End: 2025-06-15

## 2024-07-10 ENCOUNTER — HOSPITAL ENCOUNTER (OUTPATIENT)
Dept: RADIOLOGY | Facility: HOSPITAL | Age: 64
Discharge: HOME | End: 2024-07-10
Attending: OBSTETRICS & GYNECOLOGY
Payer: COMMERCIAL

## 2024-07-10 VITALS — HEIGHT: 70 IN | WEIGHT: 154 LBS | BODY MASS INDEX: 22.05 KG/M2

## 2024-07-10 DIAGNOSIS — Z12.31 BREAST CANCER SCREENING BY MAMMOGRAM: ICD-10-CM

## 2024-07-10 PROCEDURE — 77067 SCR MAMMO BI INCL CAD: CPT | Mod: TC

## 2024-07-11 ENCOUNTER — OFFICE VISIT (OUTPATIENT)
Dept: OTOLARYNGOLOGY | Facility: CLINIC | Age: 64
End: 2024-07-11
Payer: COMMERCIAL

## 2024-07-11 VITALS
HEART RATE: 78 BPM | DIASTOLIC BLOOD PRESSURE: 57 MMHG | BODY MASS INDEX: 22.93 KG/M2 | WEIGHT: 159.81 LBS | SYSTOLIC BLOOD PRESSURE: 104 MMHG

## 2024-07-11 DIAGNOSIS — J31.0 CHRONIC RHINITIS: Chronic | ICD-10-CM

## 2024-07-11 DIAGNOSIS — J34.89 LESION OF NASAL SEPTUM: Chronic | ICD-10-CM

## 2024-07-11 DIAGNOSIS — R04.0 EPISTAXIS: Primary | Chronic | ICD-10-CM

## 2024-07-11 DIAGNOSIS — H91.90 HEARING LOSS, UNSPECIFIED HEARING LOSS TYPE, UNSPECIFIED LATERALITY: ICD-10-CM

## 2024-07-11 PROCEDURE — 99999 PR PBB SHADOW E&M-EST. PATIENT-LVL III: CPT | Mod: PBBFAC,,, | Performed by: OTOLARYNGOLOGY

## 2024-07-11 RX ORDER — MUPIROCIN 20 MG/G
OINTMENT TOPICAL 2 TIMES DAILY
Qty: 15 G | Refills: 3 | Status: SHIPPED | OUTPATIENT
Start: 2024-07-11 | End: 2024-07-21

## 2024-07-11 NOTE — PATIENT INSTRUCTIONS
Mupirocin ointment twice daily.   Saline nasal spray daily.   Zyrtec daily.     Audiogram at next visit.      NOSEBLEED TREATMENT AND PREVENTION    Nosebleeds some can be frightening, but typically are not life threatening. Nosebleeds are common during the  winter months when the heated air dries out the nasal membranes so crusting, cracking, and bleeding occurs.  Nosebleeds may be cause from trauma to the nose and even picking the nose.    How to stop a Nosebleed:  1. Pinch the soft parts of the nose between your thumb and two fingers.  2. Hold it for 10 minutes without releasing (timed by a clock).  3. Keep head higher than the level of the heart, sit upright.  4. Positioned forward, chin tucked to chest to avoid swallowing any blood.            If Re-bleeding Occurs:  Spray nose two times on both sides with decongestant spray (such as Afrin®  or Marlon-Synephrine®) and pinch nose and position head forward again.    To Prevent Re-bleeding After Bleeding Has Stopped  1. Do not pick, rub, or blow nose for at least a week.  2. Gently spraying a saline solution in nose 2-3 times per day.  3. Applying size of a pea amount of lubricant (normal saline gel, A&D ointment,  Vaseline, antibiotic ointment), put on the end of your fingertip and apply it inside  the nose on the middle portion (the septum) 2-3 times a day to keep the skin lubricated.   4. Use a humidifier in bedroom or any room in your home you spend long periods of time.  5. Engage in only light activity. No strenuous activity. No heavy lifting or straining.   6. Use a stool softener, if needed, to avoid escessive straining.   7. No bending over at the hips. Keep nose above your heart at all times.  8. Sneeze with an open mouth to reduce pressure from nose.   9. Avoid foods or drinks hot in temperature for at least 48 hours then progress slowly.  10. Avoid hot steamy showers or baths for one week.    If the nosebleeds persist or are recurrent, see your health  provider. Cauterization of the nasal septum may be  recommended.    When to call the Doctor or go to a Hospital Emergency Room:  Bleeding cannot be stopped or keeps reoccurring.  Bleeding is rapid or if blood loss is large.  You feel weak or faint, presumably from blood loss.  Bleeding begins by going down the back of the throat rather than the front of the nose.     How to prevent Nosebleeds:  Avoid Aspirin on NSAIDs that cause thinning of the blood.  Do not sleep or sit for long periods of time under a ceiling fan or other source of aggressive airflow.  Use a bedside humifier to increase moisture.  Use Nasal Saline or Saline Gel in the nose throughout the day to increase nasal moisture.   Use Vaseline or antibiotic ointment inside the nostrils 1-2 times a day, especially at night.    Do not vigorously blow the nose and do not pick the nose.

## 2024-07-11 NOTE — PROGRESS NOTES
Chief Complaint   Patient presents with    Epistaxis     Left nostril         HPI:   Jess Mena is a 63 y.o. female who is referred by herself for evaluation of chronic rhinitis and sinus pressure as well as occasional blood-tinged sputum and nasal secretions. She a several year(s) history of nasal congestion and postnasal drip. She describes difficulty breathing at night. There is mild bilateral nasal obstruction.She does use sinus rinses or nasal sprays, but not daily. She has occasional midface pain and pressure.  She has rhinorrhea and postnasal drip. There is not maxillary tooth pain. She denies headaches.  She has not had sinus or nasal surgery. There is no history of sinonasal trauma.  She has been diagnosed with allergic rhinitis, and has had allergy testing in the past but no immunotherapy..    She uses Sudafed p.r.n. and takes Claritin or Zyrtec as needed.  She uses steroid nasal sprays p.r.n..    She has noticed some areas of scabbing and crusting mostly in the left nostril, though none currently.      Allergy exposures:  +dogs, no carpeting, no recent mold remediation, no smoking vaping.      Patient also relates that she has had ongoing decrease in her hearing over the last few years but has not had hearing evaluation performed.                Past Medical History:   Diagnosis Date    Anxiety     wellbutrin 300    Constipation     Mixed hyperlipidemia 6/27/2013     Social History     Socioeconomic History    Marital status:     Number of children: 2   Tobacco Use    Smoking status: Never    Smokeless tobacco: Never   Substance and Sexual Activity    Alcohol use: Yes     Comment: Occationally - once every few months    Drug use: No    Sexual activity: Yes     Partners: Male     Birth control/protection: Post-menopausal, Surgical     Comment: :  Javi   (BTL- 1995)     Past Surgical History:   Procedure Laterality Date    AUGMENTATION OF BREAST      BREAST AUGMENTATION Bilateral 2012     ENDOMETRIAL ABLATION      NECK MASS EXCISION  2013    Fatty Tumor in neck     Family History   Problem Relation Name Age of Onset    Heart disease Father  60        cad    Hypertension Father      Stroke Father      Hyperlipidemia Father      Hyperlipidemia Mother      Heart disease Mother          asd    Hypertension Mother      Hyperlipidemia Brother      Hypertension Brother      No Known Problems Sister      No Known Problems Daughter      No Known Problems Daughter      Breast cancer Neg Hx      Cancer Neg Hx      Colon cancer Neg Hx      Ovarian cancer Neg Hx      Melanoma Neg Hx             Review of Systems  General: negative for chills, fever or weight loss  Psychological: negative for mood changes or depression  Ophthalmic: negative for blurry vision, photophobia or eye pain  ENT: see HPI  Respiratory: no cough, shortness of breath, or wheezing  Cardiovascular: no chest pain or dyspnea on exertion  Gastrointestinal: no abdominal pain, change in bowel habits, or black/ bloody stools  Musculoskeletal: negative for gait disturbance or muscular weakness  Neurological: no syncope or seizures; no ataxia  Dermatological: negative for pruritis,  rash and jaundice  Hematologic/lymphatic: no easy bruising, no new adenopathy      Physical Exam:    Vitals:    07/11/24 1336   BP: (!) 104/57   Pulse: 78         Constitutional:   She is oriented to person, place, and time. Vital signs are normal. She appears well-developed and well-nourished. She appears alert. She is cooperative. Normal speech.      Head:  Normocephalic and atraumatic. Salivary glands normal.  Facial strength is normal.      Ears:    Right Ear: No middle ear effusion.   Left Ear:  No middle ear effusion.     Nose:  Mucosal edema and septal deviation (slight deviation to left) present. No rhinorrhea or polyps. Epistaxis (prominent vessels noted right anterior septum, area of granulation tissue noted, approximately 2 mm, anterior left septum) is  observed. Turbinate hypertrophy (3+, boggy, congested mucosa).  Turbinates normal.  Right sinus exhibits no maxillary sinus tenderness and no frontal sinus tenderness. Left sinus exhibits no maxillary sinus tenderness and no frontal sinus tenderness.     Mouth/Throat  Oropharynx clear and moist without lesions or asymmetry, lips, teeth, and gums normal and oropharynx normal. No mucous membrane lesions. No oropharyngeal exudate, posterior oropharyngeal edema or posterior oropharyngeal erythema. +1.  Mirror exam not performed due to patient tolerance.  Mirror exam not performed due to patient tolerance.      Neck:  Neck normal without thyromegaly masses, asymmetry, normal tracheal structure, crepitus, and tenderness, thyroid normal, trachea normal, phonation normal, full range of motion with neck supple and no adenopathy. No JVD present. Carotid bruit is not present. No thyroid mass and no thyromegaly present.     She has no cervical adenopathy.     Cardiovascular:    Normal rate, regular rhythm and rate and rhythm, heart sounds, and pulses normal.              Pulmonary/Chest:   Effort and breath sounds normal.     Psychiatric:   She has a normal mood and affect. Her speech is normal and behavior is normal.     Neurological:   She is alert and oriented to person, place, and time. She has neurological normal, alert and oriented. No cranial nerve deficit.     Skin:   No abrasions, lacerations, lesions, or rashes.     Nasal lesion:      See separate procedure note for nasal cautery.      Assessment:    ICD-10-CM ICD-9-CM    1. Epistaxis  R04.0 784.7       2. Lesion of nasal septum  J34.89 478.19       3. Chronic rhinitis  J31.0 472.0       4. Hearing loss, unspecified hearing loss type, unspecified laterality  H91.90 389.9         The primary encounter diagnosis was Epistaxis. Diagnoses of Lesion of nasal septum, Chronic rhinitis, and Hearing loss, unspecified hearing loss type, unspecified laterality were also  pertinent to this visit.      Plan:    Saline nasal spray as needed.    Mupirocin ointment bilateral nostrils b.i.d..    We will discuss implementing therapy for her chronic rhinitis if the nasal cautery heals and left nasal lesion is not recurrent at next visit.  She may use Zyrtec daily for now.  Will perform audiogram at follow-up visit.    Briana Collado MD

## 2024-07-11 NOTE — PROGRESS NOTES
PROCEDURE NOTE:  Control of bilateral Anterior Epistaxis; cauterization of granulation tissue left nasal septum  Preprocedure diagnosis:  Recurrent epistaxis; left nasal septal lesion  Postprocedure diangosis:  Same  Complications:  None  Blood Loss:  Minimal    Procedure in detail:  After verbal consent was obtained, the patient's nasal cavity was anesthesized using topical Pontocaine and Afrin.  Upon examination, the patient had ectatic vessels on his anterior septum, on both the right and the left side, as well as a small area of granulation tissue noted at the left anterior septum.  Under direct visualization with a head lamp and a nasal speculum, a silver nitrate stick was first to  the right anterior and then the  left anterior septum, including cauterization of the small area of granulation tissue/nasal lesion at the left anterior septum.  A minimal amount of bleeding was noted.  Ointment was then placed on the cauterized areas.   The patient tolerated the procedure well, and there were no significant complications.    Briana Collado MD  [facility]    This note was created by combination of typed  and MModal dictation.  Transcription errors may be present.  If there are any questions, please contact me.

## 2024-08-22 ENCOUNTER — OFFICE VISIT (OUTPATIENT)
Dept: OTOLARYNGOLOGY | Facility: CLINIC | Age: 64
End: 2024-08-22
Payer: COMMERCIAL

## 2024-08-22 ENCOUNTER — CLINICAL SUPPORT (OUTPATIENT)
Dept: OTOLARYNGOLOGY | Facility: CLINIC | Age: 64
End: 2024-08-22
Payer: COMMERCIAL

## 2024-08-22 VITALS — WEIGHT: 159.31 LBS | BODY MASS INDEX: 22.85 KG/M2

## 2024-08-22 DIAGNOSIS — H91.8X3 ASYMMETRICAL HEARING LOSS: ICD-10-CM

## 2024-08-22 DIAGNOSIS — J34.89 LESION OF NASAL SEPTUM: Primary | Chronic | ICD-10-CM

## 2024-08-22 DIAGNOSIS — R04.0 EPISTAXIS: ICD-10-CM

## 2024-08-22 DIAGNOSIS — J31.0 CHRONIC RHINITIS: Chronic | ICD-10-CM

## 2024-08-22 DIAGNOSIS — H90.3 SENSORINEURAL HEARING LOSS (SNHL) OF BOTH EARS: Chronic | ICD-10-CM

## 2024-08-22 DIAGNOSIS — H90.3 SENSORINEURAL HEARING LOSS, BILATERAL: Primary | ICD-10-CM

## 2024-08-22 PROCEDURE — 99999 PR PBB SHADOW E&M-EST. PATIENT-LVL I: CPT | Mod: PBBFAC,,, | Performed by: PHYSICIAN ASSISTANT

## 2024-08-22 PROCEDURE — 99999 PR PBB SHADOW E&M-EST. PATIENT-LVL III: CPT | Mod: PBBFAC,,, | Performed by: OTOLARYNGOLOGY

## 2024-08-22 PROCEDURE — 99214 OFFICE O/P EST MOD 30 MIN: CPT | Mod: S$GLB,,, | Performed by: OTOLARYNGOLOGY

## 2024-08-22 PROCEDURE — 1160F RVW MEDS BY RX/DR IN RCRD: CPT | Mod: CPTII,S$GLB,, | Performed by: OTOLARYNGOLOGY

## 2024-08-22 PROCEDURE — 3008F BODY MASS INDEX DOCD: CPT | Mod: CPTII,S$GLB,, | Performed by: OTOLARYNGOLOGY

## 2024-08-22 PROCEDURE — 1159F MED LIST DOCD IN RCRD: CPT | Mod: CPTII,S$GLB,, | Performed by: OTOLARYNGOLOGY

## 2024-08-22 NOTE — PROGRESS NOTES
Jess Mena, a 64 y.o. female, was seen today in the clinic for an audiologic evaluation.  Patients main complaint was bilateral hearing loss, worse in the Left ear that has been progressing over the years.  She admits to difficulty understanding conversation with family and friends especially in the presence of background noise.  She reports no significant tinnitus and denies ear pain or ear drainage.  She denies a family history of hearing loss or a history of noise exposure.     Audiogram results revealed a mild to sensorineural hearing loss in the right ear and a mild to moderate sensorineural hearing loss in the left ear.  Speech reception thresholds were noted at 15 dB in the right ear and 15 dB in the left ear.  Speech discrimination scores were 96% in the right ear and 92% in the left ear.  Tympanometry revealed Type A in the right ear and Type A in the left ear. The results of the audiogram were reviewed with the patient and the benefits of amplification were discussed.    Recommendations:  Otologic evaluation  Annual audiogram  Hearing protection when in noise  Hearing aid consultation

## 2024-08-22 NOTE — PROGRESS NOTES
Chief Complaint   Patient presents with    Follow-up     Doing good since visit also stated that the nose bleeds are very light         HPI:   Jess Mena is a 63 y.o. female who is referred by herself for evaluation of chronic rhinitis and sinus pressure as well as occasional blood-tinged sputum and nasal secretions. She a several year(s) history of nasal congestion and postnasal drip. She describes difficulty breathing at night. There is mild bilateral nasal obstruction.She does use sinus rinses or nasal sprays, but not daily. She has occasional midface pain and pressure.  She has rhinorrhea and postnasal drip. There is not maxillary tooth pain. She denies headaches.  She has not had sinus or nasal surgery. There is no history of sinonasal trauma.  She has been diagnosed with allergic rhinitis, and has had allergy testing in the past but no immunotherapy..    She uses Sudafed p.r.n. and takes Claritin or Zyrtec as needed.  She uses steroid nasal sprays p.r.n..    She has noticed some areas of scabbing and crusting mostly in the left nostril, though none currently.      Allergy exposures:  +dogs, no carpeting, no recent mold remediation, no smoking vaping.      Patient also relates that she has had ongoing decrease in her hearing over the last few years but has not had hearing evaluation performed.        Interval HPI 08/22/2024 :      Patient reports she has not had as much significant epistaxis, but she has had some scant blood intermittently since last visit with nasal cautery.    Overall her nasal symptoms are improved since last visit.      Screening audiogram will be obtained today for ongoing complaints of worsening hearing over the last few years.  She does feel that over many years her left ear has been persistently worse than her right.  She does not have any significant tinnitus or loud noise exposure.  There is no family history of significant hearing loss.  She has not had any otologic  surgeries.            Past Medical History:   Diagnosis Date    Anxiety     wellbutrin 300    Constipation     Mixed hyperlipidemia 6/27/2013     Social History     Socioeconomic History    Marital status:     Number of children: 2   Tobacco Use    Smoking status: Never    Smokeless tobacco: Never   Substance and Sexual Activity    Alcohol use: Yes     Comment: Occationally - once every few months    Drug use: No    Sexual activity: Yes     Partners: Male     Birth control/protection: Post-menopausal, Surgical     Comment: :  Javi   (BTL- 1995)     Past Surgical History:   Procedure Laterality Date    AUGMENTATION OF BREAST      BREAST AUGMENTATION Bilateral 2012    ENDOMETRIAL ABLATION      NECK MASS EXCISION  2013    Fatty Tumor in neck     Family History   Problem Relation Name Age of Onset    Heart disease Father  60        cad    Hypertension Father      Stroke Father      Hyperlipidemia Father      Hyperlipidemia Mother      Heart disease Mother          asd    Hypertension Mother      Hyperlipidemia Brother      Hypertension Brother      No Known Problems Sister      No Known Problems Daughter      No Known Problems Daughter      Breast cancer Neg Hx      Cancer Neg Hx      Colon cancer Neg Hx      Ovarian cancer Neg Hx      Melanoma Neg Hx             Review of Systems  General: negative for chills, fever or weight loss  Psychological: negative for mood changes or depression  Ophthalmic: negative for blurry vision, photophobia or eye pain  ENT: see HPI  Respiratory: no cough, shortness of breath, or wheezing  Cardiovascular: no chest pain or dyspnea on exertion  Gastrointestinal: no abdominal pain, change in bowel habits, or black/ bloody stools  Musculoskeletal: negative for gait disturbance or muscular weakness  Neurological: no syncope or seizures; no ataxia  Dermatological: negative for pruritis,  rash and jaundice  Hematologic/lymphatic: no easy bruising, no new adenopathy      Physical  Exam:    There were no vitals filed for this visit.        Constitutional:   She is oriented to person, place, and time. Vital signs are normal. She appears well-developed and well-nourished. She appears alert. She is cooperative. Normal speech.      Head:  Normocephalic and atraumatic. Salivary glands normal.  Facial strength is normal.      Ears:    Right Ear: Tympanic membrane is not perforated and not erythematous. No middle ear effusion.   Left Ear: Tympanic membrane is not perforated and not erythematous.  No middle ear effusion.     Nose:  Mucosal edema and septal deviation (slight deviation to left) present. No rhinorrhea or polyps. Epistaxis (Prominent vessel still noted on anterior septum but decreased.  Previous lesion noted at anterior septum is much smaller, approximately 1-2 cm, but still present in the area) is observed. Turbinate hypertrophy (3+, boggy, congested mucosa).  Turbinates normal.  Right sinus exhibits no maxillary sinus tenderness and no frontal sinus tenderness. Left sinus exhibits no maxillary sinus tenderness and no frontal sinus tenderness.     Mouth/Throat  Oropharynx clear and moist without lesions or asymmetry, lips, teeth, and gums normal and oropharynx normal. No mucous membrane lesions. No oropharyngeal exudate, posterior oropharyngeal edema or posterior oropharyngeal erythema. +1.  Mirror exam not performed due to patient tolerance.  Mirror exam not performed due to patient tolerance.      Neck:  Neck normal without thyromegaly masses, asymmetry, normal tracheal structure, crepitus, and tenderness, thyroid normal, trachea normal, phonation normal, full range of motion with neck supple and no adenopathy. No JVD present. Carotid bruit is not present. No thyroid mass and no thyromegaly present.     She has no cervical adenopathy.     Cardiovascular:    Normal rate, regular rhythm and rate and rhythm, heart sounds, and pulses normal.              Pulmonary/Chest:   Effort and  breath sounds normal.     Psychiatric:   She has a normal mood and affect. Her speech is normal and behavior is normal.     Neurological:   She is alert and oriented to person, place, and time. She has neurological normal, alert and oriented. No cranial nerve deficit.     Skin:   No abrasions, lacerations, lesions, or rashes.     Nasal lesion:      Audiogram: Interpreted by me and reviewed with the patient today.  Bilateral sensorineural hearing loss mild sloping to moderate in high frequencies.  Left side worse than right.  Tympanograms type a bilaterally.    Assessment:    ICD-10-CM ICD-9-CM    1. Lesion of nasal septum  J34.89 478.19       2. Epistaxis  R04.0 784.7       3. Sensorineural hearing loss (SNHL) of both ears  H90.3 389.18       4. Chronic rhinitis  J31.0 472.0       5. Asymmetrical hearing loss  H91.8X3 389.9           The primary encounter diagnosis was Lesion of nasal septum. Diagnoses of Epistaxis, Sensorineural hearing loss (SNHL) of both ears, Chronic rhinitis, and Asymmetrical hearing loss were also pertinent to this visit.      Plan:    Continue Saline nasal spray as needed.    Continue Mupirocin ointment bilateral nostrils b.i.d..    We will schedule a minor procedure in clinic for excision of the small lesion at the anterior septum.    Audiogram was reviewed in detail with the patient, and she understands her hearing loss.    If and when she is a candidate for hearing aids, she was given information on how to contact the audiologist for this appointment.  I recommend annual audiograms as well as hearing protection in noise.        Briana Collado MD

## 2024-08-22 NOTE — PATIENT INSTRUCTIONS
We reviewed the patient's recent audiogram and hearing loss in detail.  We also discussed that she is a excellent candidate for hearing aids, if and when she the patient is motivated.  She was given handouts with information and pricing of hearing aids, and will contact audiology when ready to proceed.  We also discussed the use hearing protection when exposed to loud noise, including lawn equipment.      Continue mupirocin ointment in the left nostril.  Will schedule minor procedure for excision.     Ok to start Nasacort nasal spray.      How do you use a Nasal Corticosteroid Spray?    Make sure you understand your dosing instructions. Spray only the number of prescribed sprays in each nostril. Read the package instructions before using your spray the first time.    Most corticosteroid sprays suggest the following steps:    Wash your hands well.    Gently blow your nose to clear the passageway.    Shake the container several times.    Tilt your head slightly downward.  Use the opposite hand from the nostril you will be spraying to hold the spray bottle.    Block one nostril with your finger.  Insert the nasal applicator into the other nostril.    Aim the spray toward the outer wall of the nostril.  Inhale slowly through the nose and press the .    Breathe out and repeat to apply the prescribed number of sprays.  Repeat these steps for the other nostril.     Avoid sneezing or blowing your nose right after spraying.

## 2024-09-06 ENCOUNTER — OFFICE VISIT (OUTPATIENT)
Dept: PRIMARY CARE CLINIC | Facility: CLINIC | Age: 64
End: 2024-09-06
Payer: COMMERCIAL

## 2024-09-06 VITALS
OXYGEN SATURATION: 97 % | HEART RATE: 72 BPM | HEIGHT: 70 IN | DIASTOLIC BLOOD PRESSURE: 60 MMHG | BODY MASS INDEX: 22.5 KG/M2 | WEIGHT: 157.19 LBS | SYSTOLIC BLOOD PRESSURE: 108 MMHG

## 2024-09-06 DIAGNOSIS — E78.2 MIXED HYPERLIPIDEMIA: ICD-10-CM

## 2024-09-06 DIAGNOSIS — Z13.1 SCREENING FOR DIABETES MELLITUS: ICD-10-CM

## 2024-09-06 DIAGNOSIS — Z00.00 ANNUAL PHYSICAL EXAM: Primary | ICD-10-CM

## 2024-09-06 PROCEDURE — 99999 PR PBB SHADOW E&M-EST. PATIENT-LVL IV: CPT | Mod: PBBFAC,,, | Performed by: STUDENT IN AN ORGANIZED HEALTH CARE EDUCATION/TRAINING PROGRAM

## 2024-09-06 NOTE — PROGRESS NOTES
SUBJECTIVE     Chief Complaint   Patient presents with    Annual Exam       HPI  Jess Mena is a very pleasant 64 y.o. female with medical diagnoses as listed in the medical history and problem list that presents for annual exam. Pt is establishing care with me today.    Pt is UTD on age appropriate CA screening.    Family, social, surgical Hx reviewed     Lipidemia: On Lipitor 10 mg daily.      Health Maintenance         Date Due Completion Date    RSV Vaccine (Age 60+ and Pregnant patients) (1 - 1-dose 60+ series) Never done ---    Influenza Vaccine (1) 09/01/2024 11/27/2023    COVID-19 Vaccine (3 - 2023-24 season) 09/01/2024 4/7/2021    Colorectal Cancer Screening 08/29/2025 8/29/2022    Override on 8/15/2011: Done (est date)    Mammogram 07/10/2026 7/10/2024    Override on 7/2/2018: Done (DIS)    Override on 9/23/2015: Done ([facility])    TETANUS VACCINE 10/18/2028 10/18/2018    Cervical Cancer Screening 01/31/2029 1/31/2024    Override on 9/23/2015: Done    Lipid Panel 05/30/2029 5/30/2024    Override on 10/19/2015: Done              PAST MEDICAL HISTORY:  Past Medical History:   Diagnosis Date    Anxiety     wellbutrin 300    Constipation     Mixed hyperlipidemia 6/27/2013       PAST SURGICAL HISTORY:  Past Surgical History:   Procedure Laterality Date    AUGMENTATION OF BREAST      BREAST AUGMENTATION Bilateral 2012    BREAST SURGERY      implants    COSMETIC SURGERY      implants    ENDOMETRIAL ABLATION      NECK MASS EXCISION  2013    Fatty Tumor in neck    TUBAL LIGATION  1995       SOCIAL HISTORY:  Social History     Socioeconomic History    Marital status:     Number of children: 2   Tobacco Use    Smoking status: Never    Smokeless tobacco: Never   Substance and Sexual Activity    Alcohol use: Not Currently     Comment: Occationally - once every few months    Drug use: No    Sexual activity: Yes     Partners: Male     Birth control/protection: Post-menopausal     Comment: :  Javi    (BTL- 1995)       FAMILY HISTORY:  Family History   Problem Relation Name Age of Onset    Hyperlipidemia Mother ruby     Heart disease Mother ruby         asd    Hypertension Mother ruby     Thyroid disease Mother ruby     Heart disease Father alec 60        cad, smoker    Hypertension Father alec     Stroke Father alec     Hyperlipidemia Father alec     No Known Problems Sister      Hyperlipidemia Brother sawyer     Hypertension Brother sawyer     No Known Problems Daughter      No Known Problems Daughter      Breast cancer Neg Hx      Cancer Neg Hx      Colon cancer Neg Hx      Ovarian cancer Neg Hx      Melanoma Neg Hx         ALLERGIES AND MEDICATIONS: updated and reviewed.  Review of patient's allergies indicates:  No Known Allergies  Current Outpatient Medications   Medication Sig Dispense Refill    atorvastatin (LIPITOR) 10 MG tablet TAKE 1 TABLET DAILY 90 tablet 3    buPROPion (WELLBUTRIN XL) 300 MG 24 hr tablet TAKE 1 TABLET DAILY 90 tablet 3    estradiol 0.05 mg/24 hr td ptsw (VIVELLE-DOT) 0.05 mg/24 hr APPLY 1 PATCH TO SKIN TWICE WEEKLY 24 patch 3    estradiol-progesterone (BIJUVA) 1-100 mg Cap Take 1 tablet by mouth once daily. 90 capsule 3    hydroCHLOROthiazide (HYDRODIURIL) 25 MG tablet TAKE 1 TABLET DAILY AS NEEDED FOR SWELLING 90 tablet 3    lubiprostone (AMITIZA) 24 MCG Cap Take 1 capsule (24 mcg total) by mouth 2 (two) times daily. 60 capsule 11    ondansetron (ZOFRAN-ODT) 8 MG TbDL Take 1 tablet (8 mg total) by mouth 2 (two) times daily. 20 tablet 0    progesterone (PROMETRIUM) 100 MG capsule TAKE 1 CAPSULE DAILY 30 capsule 0    scopolamine (TRANSDERM-SCOP) 1.3-1.5 mg (1 mg over 3 days) Place 1 patch onto the skin every 72 hours. 3 patch 1    valACYclovir (VALTREX) 500 MG tablet TAKE 1 TABLET DAILY 90 tablet 1     No current facility-administered medications for this visit.       ROS  Review of Systems   Constitutional:  Negative for fever and weight loss.   Respiratory:  Negative for  "cough and shortness of breath.    Cardiovascular:  Negative for chest pain and palpitations.   Gastrointestinal:  Negative for abdominal pain, constipation, diarrhea, nausea and vomiting.   Genitourinary:  Negative for dysuria.   Musculoskeletal:  Negative for back pain and joint pain.   Skin:  Negative for rash.   Neurological:  Negative for dizziness, weakness and headaches.   Psychiatric/Behavioral:  Negative for depression. The patient is not nervous/anxious.            OBJECTIVE     Physical Exam  Vitals:    09/06/24 1406   BP: 108/60   Pulse: 72    Body mass index is 22.55 kg/m².  Weight: 71.3 kg (157 lb 3 oz)   Height: 5' 10" (177.8 cm)     Physical Exam  HENT:      Head: Normocephalic and atraumatic.      Nose: Nose normal.      Mouth/Throat:      Mouth: Mucous membranes are moist.      Pharynx: Oropharynx is clear.   Eyes:      Extraocular Movements: Extraocular movements intact.      Conjunctiva/sclera: Conjunctivae normal.      Pupils: Pupils are equal, round, and reactive to light.   Cardiovascular:      Rate and Rhythm: Normal rate and regular rhythm.   Pulmonary:      Effort: Pulmonary effort is normal.      Breath sounds: Normal breath sounds.   Musculoskeletal:         General: No swelling. Normal range of motion.      Cervical back: Normal range of motion.      Right lower leg: No edema.      Left lower leg: No edema.   Skin:     General: Skin is warm.      Findings: No lesion or rash.   Neurological:      General: No focal deficit present.      Mental Status: She is alert and oriented to person, place, and time.      Motor: No weakness.   Psychiatric:         Mood and Affect: Mood normal.         Thought Content: Thought content normal.           ASSESSMENT     64 y.o. female with     1. Annual physical exam    2. Screening for diabetes mellitus    3. Mixed hyperlipidemia        PLAN:     1. Annual physical exam  -     TSH; Future; Expected date: 09/06/2024  -     Lipid Panel; Future; Expected " date: 09/06/2024  -     Comprehensive Metabolic Panel; Future; Expected date: 09/06/2024  -     CBC Auto Differential; Future; Expected date: 09/06/2024    2. Screening for diabetes mellitus  -     Hemoglobin A1C; Future; Expected date: 09/06/2024    3. Mixed hyperlipidemia    Stable on medications, continue regimen      Discussed age and gender appropriate screenings at this visit and encouraged a healthy diet low in simple carbohydrates, and increased physical activity.  Counseled on medically appropriate vaccines based on age and current health condition.  Screening test reviewed and discussed with patient.      RTC in 1 year     Cecilia Hope MD  09/06/2024 2:24 PM

## 2024-09-16 NOTE — TELEPHONE ENCOUNTER
Care Due:                  Date            Visit Type   Department     Provider  --------------------------------------------------------------------------------                                             [facility]  Last Visit: 09-      [facility]          RACHEL Hope  Next Visit: None Scheduled  None         None Found                                                            Last  Test          Frequency    Reason                     Performed    Due Date  --------------------------------------------------------------------------------    CBC.........  12 months..  valACYclovir.............  08- 08-    CMP.........  12 months..  atorvastatin,              08- 08-                             valACYclovir.............    [employer] Embedded Care Due Messages. Reference number: [id number].   9/16/2024 12:40:13 AM CDT

## 2024-09-17 NOTE — TELEPHONE ENCOUNTER
Refill Routing Note   Medication(s) are not appropriate for processing by [facility] for the following reason(s):        Required labs outdated  No active prescription written by provider    [facility] action(s):  Defer   Requires labs : Yes             Appointments  past 12m or future 3m with PCP    Date Provider   Last Visit   9/6/2024 Cecilia Hope MD   Next Visit   Visit date not found Cecilia Hope MD   ED visits in past 90 days: 0        Note composed:5:41 PM 09/17/2024

## 2024-09-18 RX ORDER — ATORVASTATIN CALCIUM 10 MG/1
TABLET, FILM COATED ORAL
Qty: 90 TABLET | Refills: 3 | Status: SHIPPED | OUTPATIENT
Start: 2024-09-18

## 2024-10-29 DIAGNOSIS — F41.9 ANXIETY: ICD-10-CM

## 2024-10-29 RX ORDER — BUPROPION HYDROCHLORIDE 300 MG/1
300 TABLET ORAL
Qty: 90 TABLET | Refills: 0 | Status: SHIPPED | OUTPATIENT
Start: 2024-10-29

## 2024-12-13 DIAGNOSIS — K58.1 IRRITABLE BOWEL SYNDROME WITH CONSTIPATION: ICD-10-CM

## 2024-12-13 RX ORDER — LINACLOTIDE 72 UG/1
72 CAPSULE, GELATIN COATED ORAL
Qty: 90 CAPSULE | Refills: 3 | OUTPATIENT
Start: 2024-12-13

## 2025-01-03 DIAGNOSIS — N95.1 MENOPAUSAL SYNDROME: ICD-10-CM

## 2025-01-03 RX ORDER — ESTRADIOL 0.05 MG/D
FILM, EXTENDED RELEASE TRANSDERMAL
Qty: 24 PATCH | Refills: 0 | Status: SHIPPED | OUTPATIENT
Start: 2025-01-03

## 2025-01-13 ENCOUNTER — LAB VISIT (OUTPATIENT)
Dept: LAB | Facility: HOSPITAL | Age: 65
End: 2025-01-13
Attending: STUDENT IN AN ORGANIZED HEALTH CARE EDUCATION/TRAINING PROGRAM
Payer: COMMERCIAL

## 2025-01-13 DIAGNOSIS — Z13.1 SCREENING FOR DIABETES MELLITUS: ICD-10-CM

## 2025-01-13 DIAGNOSIS — Z00.00 ANNUAL PHYSICAL EXAM: ICD-10-CM

## 2025-01-13 LAB
BASOPHILS # BLD AUTO: 0.07 K/UL (ref 0–0.2)
BASOPHILS NFR BLD: 1.1 % (ref 0–1.9)
DIFFERENTIAL METHOD BLD: ABNORMAL
EOSINOPHIL # BLD AUTO: 0.2 K/UL (ref 0–0.5)
EOSINOPHIL NFR BLD: 3.7 % (ref 0–8)
ERYTHROCYTE [DISTWIDTH] IN BLOOD BY AUTOMATED COUNT: 12.3 % (ref 11.5–14.5)
ESTIMATED AVG GLUCOSE: 105 MG/DL (ref 68–131)
HBA1C MFR BLD: 5.3 % (ref 4–5.6)
HCT VFR BLD AUTO: 41 % (ref 37–48.5)
HGB BLD-MCNC: 13.3 G/DL (ref 12–16)
IMM GRANULOCYTES # BLD AUTO: 0.02 K/UL (ref 0–0.04)
IMM GRANULOCYTES NFR BLD AUTO: 0.3 % (ref 0–0.5)
LYMPHOCYTES # BLD AUTO: 1.9 K/UL (ref 1–4.8)
LYMPHOCYTES NFR BLD: 29.5 % (ref 18–48)
MCH RBC QN AUTO: 31.8 PG (ref 27–31)
MCHC RBC AUTO-ENTMCNC: 32.4 G/DL (ref 32–36)
MCV RBC AUTO: 98 FL (ref 82–98)
MONOCYTES # BLD AUTO: 0.5 K/UL (ref 0.3–1)
MONOCYTES NFR BLD: 7.8 % (ref 4–15)
NEUTROPHILS # BLD AUTO: 3.8 K/UL (ref 1.8–7.7)
NEUTROPHILS NFR BLD: 57.6 % (ref 38–73)
NRBC BLD-RTO: 0 /100 WBC
PLATELET # BLD AUTO: 316 K/UL (ref 150–450)
PMV BLD AUTO: 11.1 FL (ref 9.2–12.9)
RBC # BLD AUTO: 4.18 M/UL (ref 4–5.4)
WBC # BLD AUTO: 6.54 K/UL (ref 3.9–12.7)

## 2025-01-13 PROCEDURE — 83036 HEMOGLOBIN GLYCOSYLATED A1C: CPT | Performed by: STUDENT IN AN ORGANIZED HEALTH CARE EDUCATION/TRAINING PROGRAM

## 2025-01-13 PROCEDURE — 36415 COLL VENOUS BLD VENIPUNCTURE: CPT | Mod: PO | Performed by: STUDENT IN AN ORGANIZED HEALTH CARE EDUCATION/TRAINING PROGRAM

## 2025-01-13 PROCEDURE — 85025 COMPLETE CBC W/AUTO DIFF WBC: CPT | Performed by: STUDENT IN AN ORGANIZED HEALTH CARE EDUCATION/TRAINING PROGRAM

## 2025-01-14 ENCOUNTER — TELEPHONE (OUTPATIENT)
Dept: PRIMARY CARE CLINIC | Facility: CLINIC | Age: 65
End: 2025-01-14
Payer: COMMERCIAL

## 2025-01-14 DIAGNOSIS — Z00.00 ANNUAL PHYSICAL EXAM: Primary | ICD-10-CM

## 2025-01-14 NOTE — TELEPHONE ENCOUNTER
----- Message from Caleb sent at 1/14/2025  5:37 AM CST -----  Regarding: Client Services  Contact: [phone]  Good Morning,     My name is Caleb Vitale, I work in the Lab Client Services. We had a problem with some lab work on this patient. If someone from your office could call us at [phone] or [phone] that would be great. Anyone in my department can help.      Thank you,

## 2025-01-14 NOTE — TELEPHONE ENCOUNTER
Lipid Panel, CPM AND TSH was cancelled on 01/13/2025 at 17:25 by POD; Specimen hemolyzed. Hemolysis is an interfering substance for this ordered test. Pt will need to go back to the lab

## 2025-01-15 ENCOUNTER — PATIENT MESSAGE (OUTPATIENT)
Dept: PRIMARY CARE CLINIC | Facility: CLINIC | Age: 65
End: 2025-01-15
Payer: COMMERCIAL

## 2025-01-16 ENCOUNTER — LAB VISIT (OUTPATIENT)
Dept: LAB | Facility: HOSPITAL | Age: 65
End: 2025-01-16
Attending: STUDENT IN AN ORGANIZED HEALTH CARE EDUCATION/TRAINING PROGRAM
Payer: COMMERCIAL

## 2025-01-16 DIAGNOSIS — Z00.00 ANNUAL PHYSICAL EXAM: ICD-10-CM

## 2025-01-16 LAB
ALBUMIN SERPL BCP-MCNC: 3.9 G/DL (ref 3.5–5.2)
ALP SERPL-CCNC: 48 U/L (ref 40–150)
ALT SERPL W/O P-5'-P-CCNC: 19 U/L (ref 10–44)
ANION GAP SERPL CALC-SCNC: 9 MMOL/L (ref 8–16)
AST SERPL-CCNC: 22 U/L (ref 10–40)
BILIRUB SERPL-MCNC: 0.4 MG/DL (ref 0.1–1)
BUN SERPL-MCNC: 16 MG/DL (ref 8–23)
CALCIUM SERPL-MCNC: 9.6 MG/DL (ref 8.7–10.5)
CHLORIDE SERPL-SCNC: 110 MMOL/L (ref 95–110)
CHOLEST SERPL-MCNC: 211 MG/DL (ref 120–199)
CHOLEST/HDLC SERPL: 2.7 {RATIO} (ref 2–5)
CO2 SERPL-SCNC: 24 MMOL/L (ref 23–29)
CREAT SERPL-MCNC: 1.1 MG/DL (ref 0.5–1.4)
EST. GFR  (NO RACE VARIABLE): 56.5 ML/MIN/1.73 M^2
GLUCOSE SERPL-MCNC: 84 MG/DL (ref 70–110)
HDLC SERPL-MCNC: 77 MG/DL (ref 40–75)
HDLC SERPL: 36.5 % (ref 20–50)
LDLC SERPL CALC-MCNC: 119.6 MG/DL (ref 63–159)
NONHDLC SERPL-MCNC: 134 MG/DL
POTASSIUM SERPL-SCNC: 4.1 MMOL/L (ref 3.5–5.1)
PROT SERPL-MCNC: 7.1 G/DL (ref 6–8.4)
SODIUM SERPL-SCNC: 143 MMOL/L (ref 136–145)
TRIGL SERPL-MCNC: 72 MG/DL (ref 30–150)
TSH SERPL DL<=0.005 MIU/L-ACNC: 1.19 UIU/ML (ref 0.4–4)

## 2025-01-16 PROCEDURE — 84443 ASSAY THYROID STIM HORMONE: CPT | Performed by: STUDENT IN AN ORGANIZED HEALTH CARE EDUCATION/TRAINING PROGRAM

## 2025-01-16 PROCEDURE — 80053 COMPREHEN METABOLIC PANEL: CPT | Performed by: STUDENT IN AN ORGANIZED HEALTH CARE EDUCATION/TRAINING PROGRAM

## 2025-01-16 PROCEDURE — 80061 LIPID PANEL: CPT | Performed by: STUDENT IN AN ORGANIZED HEALTH CARE EDUCATION/TRAINING PROGRAM

## 2025-01-16 PROCEDURE — 36415 COLL VENOUS BLD VENIPUNCTURE: CPT | Mod: PO | Performed by: STUDENT IN AN ORGANIZED HEALTH CARE EDUCATION/TRAINING PROGRAM

## 2025-01-21 DIAGNOSIS — N17.9 ACUTE KIDNEY INJURY: Primary | ICD-10-CM

## 2025-01-22 ENCOUNTER — PATIENT MESSAGE (OUTPATIENT)
Dept: PRIMARY CARE CLINIC | Facility: CLINIC | Age: 65
End: 2025-01-22
Payer: COMMERCIAL

## 2025-02-06 ENCOUNTER — LAB VISIT (OUTPATIENT)
Dept: LAB | Facility: HOSPITAL | Age: 65
End: 2025-02-06
Attending: INTERNAL MEDICINE
Payer: COMMERCIAL

## 2025-02-06 ENCOUNTER — PATIENT MESSAGE (OUTPATIENT)
Dept: GASTROENTEROLOGY | Facility: CLINIC | Age: 65
End: 2025-02-06
Payer: COMMERCIAL

## 2025-02-06 DIAGNOSIS — K59.09 CHRONIC CONSTIPATION: Primary | ICD-10-CM

## 2025-02-06 DIAGNOSIS — N17.9 ACUTE KIDNEY INJURY: ICD-10-CM

## 2025-02-06 LAB
ALBUMIN SERPL BCP-MCNC: 3.9 G/DL (ref 3.5–5.2)
ALP SERPL-CCNC: 50 U/L (ref 40–150)
ALT SERPL W/O P-5'-P-CCNC: 13 U/L (ref 10–44)
ANION GAP SERPL CALC-SCNC: 11 MMOL/L (ref 8–16)
AST SERPL-CCNC: 19 U/L (ref 10–40)
BILIRUB SERPL-MCNC: 0.3 MG/DL (ref 0.1–1)
BUN SERPL-MCNC: 10 MG/DL (ref 8–23)
CALCIUM SERPL-MCNC: 9.8 MG/DL (ref 8.7–10.5)
CHLORIDE SERPL-SCNC: 110 MMOL/L (ref 95–110)
CO2 SERPL-SCNC: 18 MMOL/L (ref 23–29)
CREAT SERPL-MCNC: 0.9 MG/DL (ref 0.5–1.4)
EST. GFR  (NO RACE VARIABLE): >60 ML/MIN/1.73 M^2
GLUCOSE SERPL-MCNC: 109 MG/DL (ref 70–110)
POTASSIUM SERPL-SCNC: 4.3 MMOL/L (ref 3.5–5.1)
PROT SERPL-MCNC: 6.9 G/DL (ref 6–8.4)
SODIUM SERPL-SCNC: 139 MMOL/L (ref 136–145)

## 2025-02-06 PROCEDURE — 36415 COLL VENOUS BLD VENIPUNCTURE: CPT | Mod: PO | Performed by: INTERNAL MEDICINE

## 2025-02-06 PROCEDURE — 80053 COMPREHEN METABOLIC PANEL: CPT | Performed by: INTERNAL MEDICINE

## 2025-03-13 ENCOUNTER — PATIENT MESSAGE (OUTPATIENT)
Dept: OBSTETRICS AND GYNECOLOGY | Facility: CLINIC | Age: 65
End: 2025-03-13
Payer: COMMERCIAL

## 2025-03-13 DIAGNOSIS — N95.1 MENOPAUSAL HOT FLUSHES: ICD-10-CM

## 2025-03-13 RX ORDER — ESTRADIOL AND PROGESTERONE 1; 100 MG/1; MG/1
1 CAPSULE ORAL DAILY
Qty: 90 CAPSULE | Refills: 3 | Status: SHIPPED | OUTPATIENT
Start: 2025-03-13 | End: 2025-03-13 | Stop reason: SDUPTHER

## 2025-03-13 RX ORDER — ESTRADIOL AND PROGESTERONE 1; 100 MG/1; MG/1
1 CAPSULE ORAL DAILY
Qty: 90 CAPSULE | Refills: 3 | Status: SHIPPED | OUTPATIENT
Start: 2025-03-13

## 2025-04-28 ENCOUNTER — PATIENT MESSAGE (OUTPATIENT)
Dept: DERMATOLOGY | Facility: CLINIC | Age: 65
End: 2025-04-28
Payer: COMMERCIAL

## 2025-04-28 DIAGNOSIS — F41.9 ANXIETY: ICD-10-CM

## 2025-04-28 RX ORDER — BUPROPION HYDROCHLORIDE 300 MG/1
300 TABLET ORAL EVERY MORNING
Qty: 90 TABLET | Refills: 0 | Status: SHIPPED | OUTPATIENT
Start: 2025-04-28 | End: 2026-04-28

## 2025-05-02 ENCOUNTER — OFFICE VISIT (OUTPATIENT)
Dept: DERMATOLOGY | Facility: CLINIC | Age: 65
End: 2025-05-02
Payer: COMMERCIAL

## 2025-05-02 DIAGNOSIS — Z12.83 SKIN EXAM, SCREENING FOR CANCER: ICD-10-CM

## 2025-05-02 DIAGNOSIS — L81.4 LENTIGINES: ICD-10-CM

## 2025-05-02 DIAGNOSIS — L82.1 SK (SEBORRHEIC KERATOSIS): Primary | ICD-10-CM

## 2025-05-02 DIAGNOSIS — L72.9 BENIGN CYST OF SKIN: ICD-10-CM

## 2025-05-02 DIAGNOSIS — D22.9 NEVUS OF MULTIPLE SITES: ICD-10-CM

## 2025-05-02 PROCEDURE — 99999 PR PBB SHADOW E&M-EST. PATIENT-LVL III: CPT | Mod: PBBFAC,,, | Performed by: DERMATOLOGY

## 2025-05-02 NOTE — PROGRESS NOTES
Subjective:      Patient ID:  Jess Mena is a 64 y.o. female who presents for   Chief Complaint   Patient presents with    Skin Check     UBSE     Would like skin check, has an irritated keratosis on her upper back.         Review of Systems   Constitutional:  Negative for fever, chills, weight loss, weight gain, fatigue and malaise.   Skin:  Positive for itching, daily sunscreen use and activity-related sunscreen use. Negative for rash and wears hat.   Hematologic/Lymphatic: Does not bruise/bleed easily.       Objective:   Physical Exam   Constitutional: She appears well-developed and well-nourished. No distress.   Neurological: She is alert and oriented to person, place, and time. She is not disoriented.   Psychiatric: She has a normal mood and affect.   Skin:   Areas Examined (abnormalities noted in diagram):   Head / Face Inspection Performed  Neck Inspection Performed  Chest / Axilla Inspection Performed  Abdomen Inspection Performed  Back Inspection Performed  RUE Inspected  LUE Inspection Performed  RLE Inspected  LLE Inspection Performed                 Diagram Legend     Erythematous scaling macule/papule c/w actinic keratosis       Vascular papule c/w angioma      Pigmented verrucoid papule/plaque c/w seborrheic keratosis      Yellow umbilicated papule c/w sebaceous hyperplasia      Irregularly shaped tan macule c/w lentigo     1-2 mm smooth white papules consistent with Milia      Movable subcutaneous cyst with punctum c/w epidermal inclusion cyst      Subcutaneous movable cyst c/w pilar cyst      Firm pink to brown papule c/w dermatofibroma      Pedunculated fleshy papule(s) c/w skin tag(s)      Evenly pigmented macule c/w junctional nevus     Mildly variegated pigmented, slightly irregular-bordered macule c/w mildly atypical nevus      Flesh colored to evenly pigmented papule c/w intradermal nevus       Pink pearly papule/plaque c/w basal cell carcinoma      Erythematous hyperkeratotic cursted  "plaque c/w SCC      Surgical scar with no sign of skin cancer recurrence      Open and closed comedones      Inflammatory papules and pustules      Verrucoid papule consistent consistent with wart     Erythematous eczematous patches and plaques     Dystrophic onycholytic nail with subungual debris c/w onychomycosis     Umbilicated papule    Erythematous-base heme-crusted tan verrucoid plaque consistent with inflamed seborrheic keratosis     Erythematous Silvery Scaling Plaque c/w Psoriasis     See annotation      Assessment / Plan:        SK (seborrheic keratosis) multiple  Cryosurgery procedure note:    Verbal consent from the patient is obtained including, but not limited to, risk of hypopigmentation/hyperpigmentation, scar, recurrence of lesion. Liquid nitrogen cryosurgery is applied to 2 lesions mid and left upper back to produce a freeze injury.      Benign cyst of skin  reassurance     Nevus of multiple sites  The "ABCD" rules to observe pigmented lesions were reviewed.      Lentigines  The "ABCD" rules to observe pigmented lesions were reviewed.      Skin exam, screening for cancer   No lesions suspicious for malignancy noted.    Recommend daily sun protection/avoidance and use of at least SPF 30, broad spectrum sunscreen (OTC drug).                Follow up in about 1 year (around 5/2/2026).  "

## 2025-05-05 ENCOUNTER — OFFICE VISIT (OUTPATIENT)
Dept: OBSTETRICS AND GYNECOLOGY | Facility: CLINIC | Age: 65
End: 2025-05-05
Payer: COMMERCIAL

## 2025-05-05 VITALS — BODY MASS INDEX: 22.73 KG/M2 | WEIGHT: 158.75 LBS | HEIGHT: 70 IN

## 2025-05-05 DIAGNOSIS — Z01.419 ENCOUNTER FOR GYNECOLOGICAL EXAMINATION: Primary | ICD-10-CM

## 2025-05-05 DIAGNOSIS — N95.1 MENOPAUSAL SYNDROME: ICD-10-CM

## 2025-05-05 DIAGNOSIS — Z12.31 BREAST CANCER SCREENING BY MAMMOGRAM: ICD-10-CM

## 2025-05-05 PROCEDURE — 1159F MED LIST DOCD IN RCRD: CPT | Mod: CPTII,S$GLB,, | Performed by: OBSTETRICS & GYNECOLOGY

## 2025-05-05 PROCEDURE — 99396 PREV VISIT EST AGE 40-64: CPT | Mod: S$GLB,,, | Performed by: OBSTETRICS & GYNECOLOGY

## 2025-05-05 PROCEDURE — 99999 PR PBB SHADOW E&M-EST. PATIENT-LVL III: CPT | Mod: PBBFAC,,, | Performed by: OBSTETRICS & GYNECOLOGY

## 2025-05-05 PROCEDURE — 3008F BODY MASS INDEX DOCD: CPT | Mod: CPTII,S$GLB,, | Performed by: OBSTETRICS & GYNECOLOGY

## 2025-05-05 PROCEDURE — 3044F HG A1C LEVEL LT 7.0%: CPT | Mod: CPTII,S$GLB,, | Performed by: OBSTETRICS & GYNECOLOGY

## 2025-05-05 RX ORDER — PROGESTERONE 100 MG/1
100 CAPSULE ORAL NIGHTLY
Qty: 90 CAPSULE | Refills: 3 | Status: SHIPPED | OUTPATIENT
Start: 2025-05-05 | End: 2026-05-05

## 2025-05-05 NOTE — PROGRESS NOTES
Chief Complaint Well woman exam:  Annual Exam    She is established    Jessjaved Mena is a 64 y.o. female  presents for a well woman exam.    Doing great on Bijuva - likes it better than patches  S/p Ablation    Review of Systems - :Some issues with sleep   No abdominal pain, No vaginal bleeding or discharge,   No breast pain or masses, No rectal bleeding     LMP: Patient's last menstrual period was 01/15/2014..    Meds per MD: Bijuva     Last Pap:  pap & hpv negative  Last MM- birads 1b, T-C 4%  Last Colonoscopy: Cologuard  negative  Last Bone Density: never      Past Medical History:   Diagnosis Date    Anxiety     wellbutrin 300    Constipation     Mixed hyperlipidemia 2013       Past Surgical History:   Procedure Laterality Date    AUGMENTATION OF BREAST      BREAST AUGMENTATION Bilateral     BREAST SURGERY      implants    COSMETIC SURGERY      implants    ENDOMETRIAL ABLATION      NECK MASS EXCISION      Fatty Tumor in neck    TUBAL LIGATION         OB History    Para Term  AB Living   2 2 2   2   SAB IAB Ectopic Multiple Live Births       2      # Outcome Date GA Lbr Lawrence/2nd Weight Sex Type Anes PTL Lv   2 Term  40w0d  3.374 kg (7 lb 7 oz) F Vag-Spont EPI  ALFONSO   1 Term  40w0d  3.629 kg (8 lb) F Vag-Spont EPI  ALFONSO      Obstetric Comments   Menarche age 12       Family History   Problem Relation Name Age of Onset    Hyperlipidemia Mother ruby     Heart disease Mother ruby         asd    Hypertension Mother ruby     Thyroid disease Mother ruby     Heart disease Father alec 60        cad, smoker    Hypertension Father alec     Stroke Father alec     Hyperlipidemia Father alec     No Known Problems Sister      Hyperlipidemia Brother sawyer     Hypertension Brother sawyer     No Known Problems Daughter      No Known Problems Daughter      Breast cancer Neg Hx      Cancer Neg Hx      Colon cancer Neg Hx      Ovarian cancer Neg Hx       "Melanoma Neg Hx         Social History[1]        Physical Exam:  Ht 5' 10" (1.778 m)   Wt 72 kg (158 lb 11.7 oz)   LMP 01/15/2014   BMI 22.78 kg/m²     APPEARANCE: Well nourished, well developed, in no acute distress.  AFFECT: WNL, alert and oriented x 3  SKIN: No acne or hirsutism  BREASTS: Symmetrical, Bilateral implants noted                      No nipple discharge.   No palpable masses bilaterally  NODES: No inguinal nor axillary LAD  ABDOMEN: soft Non tender Non distended No masses  PELVIC: Female chaperone was present in the room during pelvic exam.   Normal external genitalia without lesions.   Normal urethral meatus.    No signif cystocele or rectocele.  Vagina atrophic without lesions or discharge.    Cervix atrophic, without lesions, discharge or tenderness.  PAP Not done  Bimanual exam shows uterus to be normal size, regular, mobile and nontender.    Adnexa without masses or tenderness.    EXTREMITIES: No edema.    ASSESSMENT AND PLAN    Jess Cortez" was seen today for annual exam.    Diagnoses and all orders for this visit:    Encounter for gynecological examination  - PAP UTD next due 2027  - normal exam   - MMG UTD next due 7/25   - Colon screening UTD         - BP normotensive    Breast cancer screening by mammogram  -     Mammo Digital Screening Bilat w/ Morris (XPD); Future    Menopausal syndrome- On Bijuva 1/100 and will add Prometrium 100 to it  and take both nightly  -     progesterone (PROMETRIUM) 100 MG capsule; Take 1 capsule (100 mg total) by mouth nightly.        Patient was counseled today on A.C.S. Pap guidelines and recommendations for yearly pelvic exams, mammograms and monthly self breast exams; to see her PCP for other health maintenance.     Patient encouraged to register for portal and results will be sent via portal.    No follow-ups on file.         Health Maintenance   Topic Date Due    Pneumococcal Vaccines (Age 50+) (1 of 1 - PCV) Never done    RSV Vaccine (Age 60+ and " Pregnant patients) (1 - Risk 60-74 years 1-dose series) Never done    COVID-19 Vaccine (3 - 2024-25 season) 09/01/2024    Colorectal Cancer Screening  08/29/2025    Influenza Vaccine (Season Ended) 09/01/2025    Mammogram  07/10/2026    TETANUS VACCINE  10/18/2028    Cervical Cancer Screening  01/31/2029    Lipid Panel  01/16/2030    Hepatitis C Screening  Completed    Shingles Vaccine  Completed    HIV Screening  Completed                                [1]   Social History  Tobacco Use    Smoking status: Never    Smokeless tobacco: Never   Substance Use Topics    Alcohol use: Not Currently     Comment: Occationally - once every few months    Drug use: No

## 2025-05-05 NOTE — PROGRESS NOTES
LMP: Patient's last menstrual period was 01/15/2014..    Meds per MD: Earle    Last Pap:  pap & hpv negative  Last MM- birads 1b, T-C 4%  Last Colonoscopy: Cologuard  negative  Last Bone Density: never

## 2025-05-09 ENCOUNTER — PATIENT MESSAGE (OUTPATIENT)
Dept: OBSTETRICS AND GYNECOLOGY | Facility: CLINIC | Age: 65
End: 2025-05-09
Payer: COMMERCIAL

## 2025-06-06 ENCOUNTER — CLINICAL SUPPORT (OUTPATIENT)
Dept: OTHER | Facility: CLINIC | Age: 65
End: 2025-06-06
Payer: COMMERCIAL

## 2025-06-06 DIAGNOSIS — Z00.8 ENCOUNTER FOR OTHER GENERAL EXAMINATION: ICD-10-CM

## 2025-06-08 LAB
GLUCOSE SERPL-MCNC: 69 MG/DL (ref 60–140)
HDLC SERPL-MCNC: 78 MG/DL
POC CHOLESTEROL, LDL (DOCK): 100 MG/DL
POC CHOLESTEROL, TOTAL: 196 MG/DL
TRIGL SERPL-MCNC: 100 MG/DL

## 2025-06-09 VITALS
DIASTOLIC BLOOD PRESSURE: 82 MMHG | HEIGHT: 70 IN | WEIGHT: 157 LBS | BODY MASS INDEX: 22.48 KG/M2 | SYSTOLIC BLOOD PRESSURE: 112 MMHG

## 2025-06-19 ENCOUNTER — PATIENT MESSAGE (OUTPATIENT)
Dept: DERMATOLOGY | Facility: CLINIC | Age: 65
End: 2025-06-19
Payer: COMMERCIAL

## 2025-06-20 RX ORDER — DOXYCYCLINE HYCLATE 100 MG
TABLET ORAL
Qty: 14 TABLET | Refills: 0 | Status: SHIPPED | OUTPATIENT
Start: 2025-06-20

## 2025-06-25 ENCOUNTER — OFFICE VISIT (OUTPATIENT)
Dept: URGENT CARE | Facility: CLINIC | Age: 65
End: 2025-06-25
Payer: COMMERCIAL

## 2025-06-25 VITALS
SYSTOLIC BLOOD PRESSURE: 126 MMHG | DIASTOLIC BLOOD PRESSURE: 84 MMHG | RESPIRATION RATE: 16 BRPM | HEIGHT: 70 IN | BODY MASS INDEX: 22.48 KG/M2 | TEMPERATURE: 98 F | OXYGEN SATURATION: 98 % | HEART RATE: 66 BPM | WEIGHT: 157 LBS

## 2025-06-25 DIAGNOSIS — L72.3 INFLAMED SEBACEOUS CYST: Primary | ICD-10-CM

## 2025-06-25 PROCEDURE — 10061 I&D ABSCESS COMP/MULTIPLE: CPT | Mod: S$GLB,,, | Performed by: PHYSICIAN ASSISTANT

## 2025-06-25 PROCEDURE — 99499 UNLISTED E&M SERVICE: CPT | Mod: S$GLB,,, | Performed by: PHYSICIAN ASSISTANT

## 2025-06-25 RX ORDER — MUPIROCIN 20 MG/G
OINTMENT TOPICAL 2 TIMES DAILY
Qty: 30 G | Refills: 0 | Status: SHIPPED | OUTPATIENT
Start: 2025-06-25 | End: 2025-07-02

## 2025-06-25 NOTE — PROGRESS NOTES
"Subjective:      Patient ID: Jess Mena is a 64 y.o. female.    Vitals:  height is 5' 10" (1.778 m) and weight is 71.2 kg (157 lb). Her oral temperature is 98 °F (36.7 °C). Her blood pressure is 126/84 and her pulse is 66. Her respiration is 16 and oxygen saturation is 98%.     Chief Complaint: Cyst on lower back / on ABX    Jess Mena is a 64 y.o. female with anxiety and constipation who complains of  ABX for an abscess/ cyst on her back , her derm MD gave her oral antibiotics (doxycycline 100 mg po BID x 7 days( on sat , she states the area is painful , states cyst has been there for over a year. Pt admits to squeezing lesion. Pt is going on a cruise to [place] on 7/11/25 with her family.     Abscess  Chronicity:  NewProgression Since Onset: worsening  Location:  Torso  Associated Symptoms: no fever, no chills, no sweats  Characteristics: draining, painful, redness and swelling    Pain Scale:  2/10  Treatments Tried:  Draining/squeezing and oral antibiotics  Relieved by:  Nothing  Worsened by:  Nothing      Constitution: Negative for chills, fatigue and fever.   Musculoskeletal:  Positive for back pain.   Skin:  Positive for lesion, skin thickening/induration, erythema and abscess.   Psychiatric/Behavioral:  Negative for nervous/anxious. The patient is not nervous/anxious.       Objective:     Physical Exam   Constitutional: She is oriented to person, place, and time. No distress.      Comments:Patient is awake and alert, sitting up in exam chair, speaking and answering in complete sentences     normal  HENT:   Head: Normocephalic and atraumatic.   Ears:   Right Ear: External ear normal.   Left Ear: External ear normal.   Nose: Nose normal.   Eyes: Conjunctivae are normal. Extraocular movement intact   Neck: Neck supple.   Pulmonary/Chest: Effort normal.   Abdominal: Normal appearance.   Musculoskeletal: Normal range of motion.         General: Normal range of motion.   Neurological: She is alert and " oriented to person, place, and time.   Skin: erythema and lesion         Comments: Erythematous soft mobile nodule with central keratin-filled punctum with surrounding erythema to lower mid back   Psychiatric: Her behavior is normal.   Nursing note and vitals reviewed.                Assessment:     1. Inflamed sebaceous cyst      Patient presents with clinical exam findings and history consistent with above.      On exam, patient is nontoxic appearing and vitals are stable.      Diagnostic testing results were reviewed and discussed with patient/guardian.   Tests ordered in clinic: None  Previous progress notes/admissions/labs and medications were reviewed.  Reviewed GFR > 60 from CMP on 2/6/25.    Plan:       Inflamed sebaceous cyst  -     mupirocin (BACTROBAN) 2 % ointment; Apply topically 2 (two) times daily. for 7 days  Dispense: 30 g; Refill: 0  -     Ambulatory referral/consult to Dermatology  -     Incision & Drainage      Incision & Drainage    Date/Time: 6/25/2025 12:15 PM    Performed by: Gala Garza PA-C  Authorized by: Gala Garza PA-C    Consent Done?:  Yes (Verbal)    Type:  Cyst  Body area:  Trunk  Location details:  Back  Anesthesia:  Local infiltration  Local anesthetic: Lidocaine 1% without epinephrine  Anesthetic total (ml):  3  Risk factor:  Underlying major vessel  Scalpel size:  10  Incision type:  Single straight  Incision depth: dermal    Complexity:  Complex  Drainage:  Bloody (keratinous debris)  Drainage amount:  Copious  Wound treatment:  Incision, drainage, deloculation, wound left open, expression of material and removal of cyst capsule  Patient tolerance:  Patient tolerated the procedure well with no immediate complications  Pain Assessment: 0    Applied nonstick gauze and nonstick dressing with tape. Reviewed instructions as printed in AVS summary.                    1) See orders for this visit as documented in the electronic medical record.  2) Symptomatic therapy suggested:  "use acetaminophen/ibuprofen every 6-8 hours prn pain or fever, push fluids.   3) Call or return to clinic prn if these symptoms worsen or fail to improve as anticipated.    Discussed results/diagnosis/plan with patient in clinic.  We had shared decision making for patient's treatment. Patient verbalized understanding and in agreement with current treatment plan.     Patient was instructed to return for re-evaluation with urgent care or PCP for continued outpatient workup and management if symptoms do not improve/worsening symptoms. Strict ED versus clinic precautions given in depth.    Discharge and follow-up instructions given verbally/printed with the patient who expressed understanding. The instructions and results are also available on [employer].              Gala "Kanwal Garza PA-C          Patient Instructions   Cyst can reoccur. Recommend surgical excision       Please keep your wound covered as it heals. Use a thin layer of antibiotic ointment (mupirocin) to help keep the wound moist. This will also keep the dressing from sticking to the wound.  you can gently wash the wound with soap and water. Pat dry and put on a clean dressing. A telfa pad will not stick to the wound.  Change your dressing once a day or every other day.  Always wash your hands before and after touching the wound.  Each time you change the dressing, look closely at the wound to be sure it is healing the right way. The wound may have a yellowish discharge, and this is normal.  Avoid picking the scab or scratching the site which may cause more irritation.  Do not soak in water or swim with an open wound.  Do not use hydrogen peroxide; it will cause the wound to dry out or delay wound healing/new skin growth.  Please complete full course of oral antibiotics.  Discussed with patient to eat a full meal before taking doxycycline to prevent nausea and vomiting. Please decrease sun exposure and do not take with dairy products.        If not " allergic, take Tylenol (Acetaminophen) 650 mg to  1 g every 6 hours as needed for fever/pain and/or Motrin (Ibuprofen) 600 to 800 mg every 6 hours as needed for pain and/or fever      Please remember that you have received care at an urgent care today. Urgent cares are not emergency rooms and are not equipped to handle life threatening emergencies and cannot rule in or out certain medical conditions and you may be released before all of your medical problems are known or treated. Please arrange follow up with your primary care physician or speciality clinic  within 2-5 days if your signs and symptoms have not resolved or worsen. Patient can call our Referral Hotline at [phone] to make an appointment.    Please return here or go to the Emergency Department for any concerns or worsening of condition.Patient was educated on signs/symptoms that would warrant emergent medical attention. Patient verbalized understanding.  Signs of infection. These include a fever of 100.4°F (38°C) or higher, chills, or wound that will not heal.  The pain in and around the area gets much worse.  There is a bad smell or pus (thick yellow, green, or gray fluid) coming from your wound.  You notice a crunchy feeling or blisters in the skin around the wound.  The redness around your wound gets bigger or is spreading up your arm or leg.  Fluid that is not pus drains from your wound.  Your swelling doesnt improve or gets worse.

## 2025-06-25 NOTE — LETTER
"  June 25, 2025      [facility] [place]  [place] [address]  Phone: [phone]  Fax: [phone]       Patient: Jess Mena   YOB: 1960  Date of Visit: 06/25/2025    To Whom It May Concern:    Zak Mena  was at [facility] on 06/25/2025. The patient may return to work/school on 6/26/25 with no restrictions. If you have any questions or concerns, or if I can be of further assistance, please do not hesitate to contact me.    Sincerely,        Galadirk Garza PA-C (Jackie)       "

## 2025-06-25 NOTE — PROCEDURES
Incision & Drainage    Date/Time: 6/25/2025 12:15 PM    Performed by: Gala Garza PA-C  Authorized by: Gala Garza PA-C    Consent Done?:  Yes (Verbal)    Type:  Cyst  Body area:  Trunk  Location details:  Back  Anesthesia:  Local infiltration  Local anesthetic: Lidocaine 1% without epinephrine  Anesthetic total (ml):  3  Risk factor:  Underlying major vessel  Scalpel size:  10  Incision type:  Single straight  Incision depth: dermal    Complexity:  Complex  Drainage:  Bloody (keratinous debris)  Drainage amount:  Copious  Wound treatment:  Incision, drainage, deloculation, wound left open, expression of material and removal of cyst capsule  Patient tolerance:  Patient tolerated the procedure well with no immediate complications  Pain Assessment: 0    Applied nonstick gauze and nonstick dressing with tape. Reviewed instructions as printed in AVS summary.

## 2025-06-25 NOTE — PATIENT INSTRUCTIONS
Cyst can reoccur. Recommend surgical excision       Please keep your wound covered as it heals. Use a thin layer of antibiotic ointment (mupirocin) to help keep the wound moist. This will also keep the dressing from sticking to the wound.  you can gently wash the wound with soap and water. Pat dry and put on a clean dressing. A telfa pad will not stick to the wound.  Change your dressing once a day or every other day.  Always wash your hands before and after touching the wound.  Each time you change the dressing, look closely at the wound to be sure it is healing the right way. The wound may have a yellowish discharge, and this is normal.  Avoid picking the scab or scratching the site which may cause more irritation.  Do not soak in water or swim with an open wound.  Do not use hydrogen peroxide; it will cause the wound to dry out or delay wound healing/new skin growth.  Please complete full course of oral antibiotics.  Discussed with patient to eat a full meal before taking doxycycline to prevent nausea and vomiting. Please decrease sun exposure and do not take with dairy products.        If not allergic, take Tylenol (Acetaminophen) 650 mg to  1 g every 6 hours as needed for fever/pain and/or Motrin (Ibuprofen) 600 to 800 mg every 6 hours as needed for pain and/or fever      Please remember that you have received care at an urgent care today. Urgent cares are not emergency rooms and are not equipped to handle life threatening emergencies and cannot rule in or out certain medical conditions and you may be released before all of your medical problems are known or treated. Please arrange follow up with your primary care physician or speciality clinic  within 2-5 days if your signs and symptoms have not resolved or worsen. Patient can call our Referral Hotline at [phone] to make an appointment.    Please return here or go to the Emergency Department for any concerns or worsening of condition.Patient was educated  on signs/symptoms that would warrant emergent medical attention. Patient verbalized understanding.  Signs of infection. These include a fever of 100.4°F (38°C) or higher, chills, or wound that will not heal.  The pain in and around the area gets much worse.  There is a bad smell or pus (thick yellow, green, or gray fluid) coming from your wound.  You notice a crunchy feeling or blisters in the skin around the wound.  The redness around your wound gets bigger or is spreading up your arm or leg.  Fluid that is not pus drains from your wound.  Your swelling doesnt improve or gets worse.

## 2025-07-24 ENCOUNTER — LAB VISIT (OUTPATIENT)
Dept: LAB | Facility: HOSPITAL | Age: 65
End: 2025-07-24
Attending: OBSTETRICS & GYNECOLOGY
Payer: COMMERCIAL

## 2025-07-24 ENCOUNTER — TELEPHONE (OUTPATIENT)
Dept: OBSTETRICS AND GYNECOLOGY | Facility: CLINIC | Age: 65
End: 2025-07-24
Payer: COMMERCIAL

## 2025-07-24 DIAGNOSIS — R30.0 DYSURIA: ICD-10-CM

## 2025-07-24 DIAGNOSIS — R30.0 DYSURIA: Primary | ICD-10-CM

## 2025-07-24 PROCEDURE — 87088 URINE BACTERIA CULTURE: CPT

## 2025-07-24 RX ORDER — NITROFURANTOIN 25; 75 MG/1; MG/1
100 CAPSULE ORAL 2 TIMES DAILY
Qty: 14 CAPSULE | Refills: 0 | Status: SHIPPED | OUTPATIENT
Start: 2025-07-24 | End: 2025-07-31

## 2025-07-26 ENCOUNTER — RESULTS FOLLOW-UP (OUTPATIENT)
Dept: OBSTETRICS AND GYNECOLOGY | Facility: CLINIC | Age: 65
End: 2025-07-26
Payer: COMMERCIAL

## 2025-07-26 DIAGNOSIS — N39.0 URINARY TRACT INFECTION WITHOUT HEMATURIA, SITE UNSPECIFIED: Primary | ICD-10-CM

## 2025-07-26 LAB — BACTERIA UR CULT: ABNORMAL

## 2025-07-28 RX ORDER — CEPHALEXIN 500 MG/1
500 CAPSULE ORAL 4 TIMES DAILY
Qty: 28 CAPSULE | Refills: 0 | Status: SHIPPED | OUTPATIENT
Start: 2025-07-28 | End: 2025-07-28 | Stop reason: SDUPTHER

## 2025-07-28 RX ORDER — CEPHALEXIN 500 MG/1
500 CAPSULE ORAL 4 TIMES DAILY
Qty: 28 CAPSULE | Refills: 0 | Status: SHIPPED | OUTPATIENT
Start: 2025-07-28 | End: 2025-08-07

## 2025-07-28 RX ORDER — BUPROPION HYDROCHLORIDE 300 MG/1
300 TABLET ORAL EVERY MORNING
Qty: 90 TABLET | Refills: 3 | Status: SHIPPED | OUTPATIENT
Start: 2025-07-28

## 2025-07-28 NOTE — TELEPHONE ENCOUNTER
Refill Routing Note   Medication(s) are not appropriate for processing by [facility] for the following reason(s):        New or recently adjusted medication    [facility] action(s):  Defer               Appointments  past 12m or future 3m with PCP    Date Provider   Last Visit   5/5/2025 Derick Villavicencio MD   Next Visit   Visit date not found Derick Villavicencio MD   ED visits in past 90 days: 0        Note composed:1:40 AM 07/28/2025

## 2025-07-29 ENCOUNTER — PATIENT MESSAGE (OUTPATIENT)
Dept: ADMINISTRATIVE | Facility: HOSPITAL | Age: 65
End: 2025-07-29
Payer: COMMERCIAL

## 2025-08-14 ENCOUNTER — HOSPITAL ENCOUNTER (OUTPATIENT)
Dept: RADIOLOGY | Facility: HOSPITAL | Age: 65
Discharge: HOME | End: 2025-08-14
Attending: OBSTETRICS & GYNECOLOGY
Payer: COMMERCIAL

## 2025-08-14 VITALS — BODY MASS INDEX: 22.48 KG/M2 | HEIGHT: 70 IN | WEIGHT: 157 LBS

## 2025-08-14 DIAGNOSIS — Z12.31 BREAST CANCER SCREENING BY MAMMOGRAM: ICD-10-CM

## 2025-08-14 PROCEDURE — 77063 BREAST TOMOSYNTHESIS BI: CPT | Mod: TC
